# Patient Record
Sex: FEMALE | Race: WHITE | NOT HISPANIC OR LATINO | Employment: PART TIME
[De-identification: names, ages, dates, MRNs, and addresses within clinical notes are randomized per-mention and may not be internally consistent; named-entity substitution may affect disease eponyms.]

---

## 2017-09-10 ENCOUNTER — HEALTH MAINTENANCE LETTER (OUTPATIENT)
Age: 42
End: 2017-09-10

## 2018-01-18 ENCOUNTER — OFFICE VISIT (OUTPATIENT)
Dept: FAMILY MEDICINE | Facility: CLINIC | Age: 43
End: 2018-01-18
Payer: COMMERCIAL

## 2018-01-18 VITALS
SYSTOLIC BLOOD PRESSURE: 84 MMHG | DIASTOLIC BLOOD PRESSURE: 53 MMHG | OXYGEN SATURATION: 98 % | HEART RATE: 94 BPM | TEMPERATURE: 98.4 F

## 2018-01-18 DIAGNOSIS — R11.2 INTRACTABLE VOMITING WITH NAUSEA, UNSPECIFIED VOMITING TYPE: ICD-10-CM

## 2018-01-18 DIAGNOSIS — N10 ACUTE PYELONEPHRITIS: Primary | ICD-10-CM

## 2018-01-18 LAB
ALBUMIN UR-MCNC: 100 MG/DL
APPEARANCE UR: CLEAR
BACTERIA #/AREA URNS HPF: ABNORMAL /HPF
BILIRUB UR QL STRIP: ABNORMAL
COLOR UR AUTO: YELLOW
GLUCOSE UR STRIP-MCNC: NEGATIVE MG/DL
HGB UR QL STRIP: ABNORMAL
KETONES UR STRIP-MCNC: >=80 MG/DL
LEUKOCYTE ESTERASE UR QL STRIP: ABNORMAL
MUCOUS THREADS #/AREA URNS LPF: PRESENT /LPF
NITRATE UR QL: NEGATIVE
NON-SQ EPI CELLS #/AREA URNS LPF: ABNORMAL /LPF
PH UR STRIP: 5 PH (ref 5–7)
RBC #/AREA URNS AUTO: ABNORMAL /HPF
RENAL EPI CELLS #/AREA URNS HPF: ABNORMAL /HPF
SOURCE: ABNORMAL
SP GR UR STRIP: 1.02 (ref 1–1.03)
UROBILINOGEN UR STRIP-ACNC: 0.2 EU/DL (ref 0.2–1)
WBC #/AREA URNS AUTO: ABNORMAL /HPF

## 2018-01-18 PROCEDURE — 87086 URINE CULTURE/COLONY COUNT: CPT | Performed by: PHYSICIAN ASSISTANT

## 2018-01-18 PROCEDURE — 96372 THER/PROPH/DIAG INJ SC/IM: CPT | Performed by: PHYSICIAN ASSISTANT

## 2018-01-18 PROCEDURE — 99214 OFFICE O/P EST MOD 30 MIN: CPT | Mod: 25 | Performed by: PHYSICIAN ASSISTANT

## 2018-01-18 PROCEDURE — 81001 URINALYSIS AUTO W/SCOPE: CPT | Performed by: PHYSICIAN ASSISTANT

## 2018-01-18 RX ORDER — ONDANSETRON 4 MG/1
4 TABLET, ORALLY DISINTEGRATING ORAL ONCE
Qty: 1 TABLET | Refills: 1
Start: 2018-01-18 | End: 2018-01-18

## 2018-01-18 RX ORDER — CIPROFLOXACIN 500 MG/1
500 TABLET, FILM COATED ORAL 2 TIMES DAILY
Qty: 20 TABLET | Refills: 0 | Status: SHIPPED | OUTPATIENT
Start: 2018-01-18 | End: 2018-05-02

## 2018-01-18 RX ORDER — ONDANSETRON 4 MG/1
4-8 TABLET, ORALLY DISINTEGRATING ORAL EVERY 8 HOURS PRN
Qty: 20 TABLET | Refills: 1 | Status: SHIPPED | OUTPATIENT
Start: 2018-01-18 | End: 2018-05-02

## 2018-01-18 RX ORDER — CEFTRIAXONE 1 G/1
1000 INJECTION, POWDER, FOR SOLUTION INTRAMUSCULAR; INTRAVENOUS ONCE
Qty: 10 ML | Refills: 0 | OUTPATIENT
Start: 2018-01-18 | End: 2018-01-18

## 2018-01-18 ASSESSMENT — PAIN SCALES - GENERAL: PAINLEVEL: NO PAIN (0)

## 2018-01-18 NOTE — NURSING NOTE
The following medication was given at 4:00 pm:     MEDICATION: Ondansetron Orally Disintegrating Tablets 4mg  SITE: orally  LOT #: [id number]  :  Vianca  EXPIRATION DATE:  05/2020  NDC 65682-390-10    Due to injection administration, patient instructed to remain in clinic for 15 minutes  afterwards, and to report any adverse reaction to me immediately.

## 2018-01-18 NOTE — PATIENT INSTRUCTIONS
"Cipro 500 mg twice a day for 10 days  Zofran 1-2 tablets  Every 6-8 hours as needed for nausea  Tylenol or Ibuprofen for pain or fever  Increase fluids  Follow up in 3 days if not improves  Kidney Infection (Adult, Female)    An infection in one or both kidneys is called \"pyelonephritis.\" It usually happens when bacteria (or rarely, viruses, fungi, or other disease-causing organisms) get into the kidney. The bacteria (or other disease-causing organisms) can enter the kidneys from the bladder or blood traveling from other parts of the body. A kidney infection can become serious. It can cause severe illness, scarring of the kidneys, or kidney failure if not treated properly.  Common causes for this problem include:    Not keeping the genital area clean and dry, which promotes the growth of bacteria    Wiping back to front which drags bacteria from the rectum toward the urinary opening (urethra)    Wearing tight pants or underwear (this lets moisture build up in the genital area, which helps bacteria grow)    Holding urine in for long periods of time    Dehydration  Kidney infections can cause symptoms similar to a bladder infection. Symptoms include:    Pain (or burning) when urinating    Having to urinate more often than usual    Blood in the urine (pink or red)    Abdominal pain or discomfort, usually in the lower abdomen    Pain in the side or back    Pain above the pubic bone    Fever or chills    Vomiting    Loss of appetite  Treatment is oral antibiotics, or in more severe cases, intramuscular or IV antibiotics. These are started right away and may be changed once urine culture results determine the infecting organisms. Treatment helps prevent a more serious kidney infection.  Medicines  Medicines can help in the treatment of a bladder infection:    Take antibiotics until they are used up, even if you feel better. It is important to finish them to make sure the infection is gone.    Unless another medicine was " prescribed, you can use over-the-counter medicines for pain, fever, or discomfort. If you have chronic liver of kidney disease, talk with your healthcare provider before using these medicines. Also talk with your provider if you've ever had a stomach ulcer or gastrointestinal (GI) bleeding, or are taking blood thinners.  Home care  The following are general care guidelines:    Stay home from work or school. Rest in bed until your fever breaks and you are feeling better, or as advised by your healthcare provider.    Drink lots of fluid unless you must restrict fluids for other medical reasons. This will force the medicine into your urinary system and flush the bacteria out of your body. Ask your healthcare provider how much you should drink.    Avoid sex until you have finished all of your medicine and your symptoms are gone.    Avoid caffeine, alcohol, and spicy foods. These foods may irritate the kidneys and bladder.    Avoid taking bubble baths. Sensitivity to the chemicals in bubble baths can irritate the urethra.    Make sure you wipe from front to back after using the toilet.    Wear loose cloths and cotton underwear.  Prevention  These self-care steps can help prevent future infections:    Drink plenty of fluids to prevent dehydration and flush out the bladder. Do this unless you must restrict fluids for other health reasons, or your healthcare provider told you not to.    Proper cleaning after going to the bathroom in important. Make sure you wipe from front to back after using the toilet.    Urinate more often. Don't try to hold urine in for a long time.    Avoid tight-fitting pants and underwear.    Improve your diet to prevent constipation. Eat more fruits, vegetables, and fiber. Eat less junk and fatty foods. Constipation can make a urinary tract infection more likely. Talk with your healthcare provider if you have trouble with bowel movements.    Urinate right after intercourse to flush out the  bladder.  Follow-up care  Follow up with your healthcare provider, or as advised. Additional testing may be needed to make sure the infection has cleared. Close follow-up and further testing is very important to find the cause and to prevent future infections.  If a urine culture was done, you will be contacted if your treatment needs to be changed. If directed, you may call to find out the results.  If you had an X-ray, CT scan, or other diagnostic test, you will be notified of any new findings that may affect your care.  Call 911  Call 911 if any of the following occur:    Trouble breathing    Fainting or loss of consciousness    Rapid or very slow heart rate    Weakness, dizziness, or fainting    Difficulty arousing or confusion  When to seek medical advice  Call your healthcare provider right away if any of these occur:    Fever 100.4 F (38 C) or higher after 48 hours of treatment, or as advised by your healthcare provider    Not feeling better within 1 to 2 days after starting antibiotics    Any symptom that continues after 3 days of treatment    Increasing pain in the stomach, back, side, or groin area    Repeated vomiting    Not able to take prescribed medicine due to nausea or another reason    Bloody, dark-colored, or foul smelling urine    Trouble urinating or decreased urine output    No urine for 8 hours, no tears when crying, sunken eyes, or dry mouth  Date Last Reviewed: 10/1/2016    [id number] [employer]. [address] [place] [address], [place] [address]. All rights reserved. This information is not intended as a substitute for professional medical care. Always follow your healthcare professional's instructions.

## 2018-01-18 NOTE — PROGRESS NOTES
SUBJECTIVE:   Cherrie Austin is a 42 year old female who presents to clinic today for the following health issues:      URINARY TRACT SYMPTOMS      Duration: x 2 days    Description  dysuria and urgency    Intensity:  severe    Accompanying signs and symptoms:  Fever/chills: YES  Flank pain no   Nausea and vomiting: YES  Vaginal symptoms: none  Abdominal/Pelvic Pain: YES    History  History of frequent UTI's: no   History of kidney stones: no   Sexually Active: YES  Possibility of pregnancy: No    Precipitating or alleviating factors: None    Therapies tried and outcome: course of antibiotics - Macrobid (received through ([email or web address]) and cranberry juice  Tylenol, IBUnot better   Outcome: getting worse        Problem list and histories reviewed & adjusted, as indicated.  Additional history: as documented    Patient Active Problem List   Diagnosis     Other kyphoscoliosis and scoliosis     Family history of breast cancer in first degree relative     CARDIOVASCULAR SCREENING; LDL GOAL LESS THAN 160     Right hip pain     Cystic breast     Past Surgical History:   Procedure Laterality Date     C SCOLIOSIS SCREENING      surgery age 15       Social History   Substance Use Topics     Smoking status: Never Smoker     Smokeless tobacco: Never Used     Alcohol use 0.5 oz/week      Comment: 1-2 drinks a month     Family History   Problem Relation Age of Onset     Asthma Mother      Breast Cancer Mother 48     CEREBROVASCULAR DISEASE Maternal Grandmother      CANCER Maternal Grandmother      Colon ca     CANCER Maternal Grandfather      Lung ca     Genitourinary Problems Paternal Grandmother      Psychotic Disorder Paternal Grandfather      Psychotic Disorder Brother      CANCER Paternal Grandmother      2 Great Aunts with breast cancer 60     CANCER       maternal cousin     Alzheimer Disease Paternal Grandfather      Alzheimer Disease Other      paternal cousins         Current Outpatient Prescriptions   Medication Sig  Dispense Refill     Nitrofurantoin Monohyd Macro (MACROBID PO) Take 100 mg by mouth 2 times daily (with meals)       ondansetron (ZOFRAN ODT) 4 MG ODT tab Take 1-2 tablets (4-8 mg) by mouth every 8 hours as needed for nausea 20 tablet 1     cefTRIAXone (ROCEPHIN) 1 GM vial Inject 1 g (1,000 mg) into the muscle once for 1 dose 10 mL 0     ondansetron (ZOFRAN ODT) 4 MG ODT tab Take 1 tablet (4 mg) by mouth once for 1 dose 1 tablet 1     ciprofloxacin (CIPRO) 500 MG tablet Take 1 tablet (500 mg) by mouth 2 times daily 20 tablet 0     Allergies   Allergen Reactions     Penicillins      rash       ROS:  Constitutional, HEENT, cardiovascular, pulmonary, GI, , musculoskeletal, neuro, skin, endocrine and psych systems are negative, except as otherwise noted.      OBJECTIVE:   BP (!) 84/53  Pulse 94  Temp 98.4  F (36.9  C) (Oral)  SpO2 98%  Breastfeeding? No  There is no height or weight on file to calculate BMI.  GENERAL: healthy, alert and no distress  NECK: no adenopathy, no asymmetry, masses, or scars and thyroid normal to palpation  RESP: lungs clear to auscultation - no rales, rhonchi or wheezes  CV: regular rate and rhythm, normal S1 S2, no S3 or S4, no murmur, click or rub, no peripheral edema and peripheral pulses strong  ABDOMEN: soft, nontender, no hepatosplenomegaly, no masses and bowel sounds normal  MS: no gross musculoskeletal defects noted, no edema  BACK:  CVA tenderness on the left     Diagnostic Test Results:  Results for orders placed or performed in visit on 01/18/18 (from the past 24 hour(s))   *UA reflex to Microscopic and Culture ([place] and [facility] (except [place] and [place])   Result Value Ref Range    Color Urine Yellow     Appearance Urine Clear     Glucose Urine Negative NEG^Negative mg/dL    Bilirubin Urine Small (A) NEG^Negative    Ketones Urine >=80 (A) NEG^Negative mg/dL    Specific Gravity Urine 1.020 1.003 - 1.035    Blood Urine Large (A) NEG^Negative    pH Urine 5.0 5.0  - 7.0 pH    Protein Albumin Urine 100 (A) NEG^Negative mg/dL    Urobilinogen Urine 0.2 0.2 - 1.0 EU/dL    Nitrite Urine Negative NEG^Negative    Leukocyte Esterase Urine Moderate (A) NEG^Negative    Source Midstream Urine    Urine Microscopic   Result Value Ref Range    WBC Urine 10-25 (A) OTO2^O - 2 /HPF    RBC Urine 10-25 (A) OTO2^O - 2 /HPF    Squamous Epithelial /LPF Urine Moderate (A) FEW^Few /LPF    Renal Tub Epi Few (A) NEG^Negative /HPF    Bacteria Urine Moderate (A) NEG^Negative /HPF    Mucous Urine Present (A) NEG^Negative /LPF       ASSESSMENT/PLAN:       ICD-10-CM    1. Acute pyelonephritis N10 Urine Culture Aerobic Bacterial     ondansetron (ZOFRAN ODT) 4 MG ODT tab     cefTRIAXone (ROCEPHIN) 1 GM vial     ondansetron (ZOFRAN ODT) 4 MG ODT tab     ciprofloxacin (CIPRO) 500 MG tablet   2. Intractable vomiting with nausea, unspecified vomiting type R11.2 ondansetron (ZOFRAN ODT) 4 MG ODT tab     ondansetron (ZOFRAN ODT) 4 MG ODT tab   patient received Rocephin 1 gram IM and tolerated well in clinic.   Zofran 4 mg ODT in clinic   Cipro 500 mg twice a day for 10 days  Zofran 1-2 tablets  Every 6-8 hours as needed for nausea  Tylenol or Ibuprofen for pain or fever  Increase fluids  Follow up in 3 days if not improves      Ольга Bond PA-C  [facility]

## 2018-01-18 NOTE — MR AVS SNAPSHOT
"              After Visit Summary   1/18/2018    Cherrie Austin    MRN: [id number]           Patient Information     Date Of Birth          1975        Visit Information        Provider Department      1/18/2018 3:20 PM Ольга Bond PA-C [facility]        Today's Diagnoses     Acute pyelonephritis    -  1    Intractable vomiting with nausea, unspecified vomiting type          Care Instructions    Cipro 500 mg twice a day for 10 days  Zofran 1-2 tablets  Every 6-8 hours as needed for nausea  Tylenol or Ibuprofen for pain or fever  Increase fluids  Follow up in 3 days if not improves  Kidney Infection (Adult, Female)    An infection in one or both kidneys is called \"pyelonephritis.\" It usually happens when bacteria (or rarely, viruses, fungi, or other disease-causing organisms) get into the kidney. The bacteria (or other disease-causing organisms) can enter the kidneys from the bladder or blood traveling from other parts of the body. A kidney infection can become serious. It can cause severe illness, scarring of the kidneys, or kidney failure if not treated properly.  Common causes for this problem include:    Not keeping the genital area clean and dry, which promotes the growth of bacteria    Wiping back to front which drags bacteria from the rectum toward the urinary opening (urethra)    Wearing tight pants or underwear (this lets moisture build up in the genital area, which helps bacteria grow)    Holding urine in for long periods of time    Dehydration  Kidney infections can cause symptoms similar to a bladder infection. Symptoms include:    Pain (or burning) when urinating    Having to urinate more often than usual    Blood in the urine (pink or red)    Abdominal pain or discomfort, usually in the lower abdomen    Pain in the side or back    Pain above the pubic bone    Fever or chills    Vomiting    Loss of appetite  Treatment is oral antibiotics, or in more severe " cases, intramuscular or IV antibiotics. These are started right away and may be changed once urine culture results determine the infecting organisms. Treatment helps prevent a more serious kidney infection.  Medicines  Medicines can help in the treatment of a bladder infection:    Take antibiotics until they are used up, even if you feel better. It is important to finish them to make sure the infection is gone.    Unless another medicine was prescribed, you can use over-the-counter medicines for pain, fever, or discomfort. If you have chronic liver of kidney disease, talk with your healthcare provider before using these medicines. Also talk with your provider if you've ever had a stomach ulcer or gastrointestinal (GI) bleeding, or are taking blood thinners.  Home care  The following are general care guidelines:    Stay home from work or school. Rest in bed until your fever breaks and you are feeling better, or as advised by your healthcare provider.    Drink lots of fluid unless you must restrict fluids for other medical reasons. This will force the medicine into your urinary system and flush the bacteria out of your body. Ask your healthcare provider how much you should drink.    Avoid sex until you have finished all of your medicine and your symptoms are gone.    Avoid caffeine, alcohol, and spicy foods. These foods may irritate the kidneys and bladder.    Avoid taking bubble baths. Sensitivity to the chemicals in bubble baths can irritate the urethra.    Make sure you wipe from front to back after using the toilet.    Wear loose cloths and cotton underwear.  Prevention  These self-care steps can help prevent future infections:    Drink plenty of fluids to prevent dehydration and flush out the bladder. Do this unless you must restrict fluids for other health reasons, or your healthcare provider told you not to.    Proper cleaning after going to the bathroom in important. Make sure you wipe from front to back after  using the toilet.    Urinate more often. Don't try to hold urine in for a long time.    Avoid tight-fitting pants and underwear.    Improve your diet to prevent constipation. Eat more fruits, vegetables, and fiber. Eat less junk and fatty foods. Constipation can make a urinary tract infection more likely. Talk with your healthcare provider if you have trouble with bowel movements.    Urinate right after intercourse to flush out the bladder.  Follow-up care  Follow up with your healthcare provider, or as advised. Additional testing may be needed to make sure the infection has cleared. Close follow-up and further testing is very important to find the cause and to prevent future infections.  If a urine culture was done, you will be contacted if your treatment needs to be changed. If directed, you may call to find out the results.  If you had an X-ray, CT scan, or other diagnostic test, you will be notified of any new findings that may affect your care.  Call 911  Call 911 if any of the following occur:    Trouble breathing    Fainting or loss of consciousness    Rapid or very slow heart rate    Weakness, dizziness, or fainting    Difficulty arousing or confusion  When to seek medical advice  Call your healthcare provider right away if any of these occur:    Fever 100.4 F (38 C) or higher after 48 hours of treatment, or as advised by your healthcare provider    Not feeling better within 1 to 2 days after starting antibiotics    Any symptom that continues after 3 days of treatment    Increasing pain in the stomach, back, side, or groin area    Repeated vomiting    Not able to take prescribed medicine due to nausea or another reason    Bloody, dark-colored, or foul smelling urine    Trouble urinating or decreased urine output    No urine for 8 hours, no tears when crying, sunken eyes, or dry mouth  Date Last Reviewed: 10/1/2016    0219-1321 [employer]. [address] [place] [address], [place] [address]. All rights  reserved. This information is not intended as a substitute for professional medical care. Always follow your healthcare professional's instructions.                Follow-ups after your visit        Who to contact     If you have questions or need follow up information about today's clinic visit or your schedule please contact [facility] directly at [phone].  Normal or non-critical lab and imaging results will be communicated to you by MyChart, letter or phone within 4 business days after the clinic has received the results. If you do not hear from us within 7 days, please contact the clinic through [employer] or phone. If you have a critical or abnormal lab result, we will notify you by phone as soon as possible.  Submit refill requests through [employer] or call your pharmacy and they will forward the refill request to us. Please allow 3 business days for your refill to be completed.          Additional Information About Your Visit        MyChart Information     [employer] gives you secure access to your electronic health record. If you see a primary care provider, you can also send messages to your care team and make appointments. If you have questions, please call your primary care clinic.  If you do not have a primary care provider, please call [phone] and they will assist you.        Care EveryWhere ID     This is your Care EveryWhere ID. This could be used by other organizations to access your [facility] medical records  [id number]        Your Vitals Were     Pulse Temperature Pulse Oximetry Breastfeeding?          94 98.4  F (36.9  C) (Oral) 98% No         Blood Pressure from Last 3 Encounters:   01/18/18 (!) 84/53   05/05/15 98/58   07/15/14 102/60    Weight from Last 3 Encounters:   05/05/15 114 lb 11.2 oz (52 kg)   07/15/14 116 lb (52.6 kg)   03/25/14 120 lb 4.8 oz (54.6 kg)              We Performed the Following     *UA reflex to Microscopic and Culture ([place] and [facility]  (except [place] and [place])     INJECTION INTRAMUSCULAR OR SUB-Q     Urine Culture Aerobic Bacterial     Urine Microscopic          Today's Medication Changes          These changes are accurate as of: 1/18/18  4:28 PM.  If you have any questions, ask your nurse or doctor.               Start taking these medicines.        Dose/Directions    cefTRIAXone 1 GM vial   Commonly known as:  ROCEPHIN   Used for:  Acute pyelonephritis   Started by:  Ольга Bond PA-C        Dose:  1000 mg   Inject 1 g (1,000 mg) into the muscle once for 1 dose   Quantity:  10 mL   Refills:  0       ciprofloxacin 500 MG tablet   Commonly known as:  CIPRO   Used for:  Acute pyelonephritis   Started by:  Ольга Bond PA-C        Dose:  500 mg   Take 1 tablet (500 mg) by mouth 2 times daily   Quantity:  20 tablet   Refills:  0       * ondansetron 4 MG ODT tab   Commonly known as:  ZOFRAN ODT   Used for:  Acute pyelonephritis, Intractable vomiting with nausea, unspecified vomiting type   Started by:  Ольга Bond PA-C        Dose:  4-8 mg   Take 1-2 tablets (4-8 mg) by mouth every 8 hours as needed for nausea   Quantity:  20 tablet   Refills:  1       * ondansetron 4 MG ODT tab   Commonly known as:  ZOFRAN ODT   Used for:  Acute pyelonephritis, Intractable vomiting with nausea, unspecified vomiting type   Started by:  Ольга Bond PA-C        Dose:  4 mg   Take 1 tablet (4 mg) by mouth once for 1 dose   Quantity:  1 tablet   Refills:  1       * Notice:  This list has 2 medication(s) that are the same as other medications prescribed for you. Read the directions carefully, and ask your doctor or other care provider to review them with you.      Stop taking these medicines if you haven't already. Please contact your care team if you have questions.     CALCIUM + D PO   Stopped by:  Ольга Bond PA-C           fish oil-omega-3 fatty acids 1000 MG capsule    Stopped by:  Ольга Bond PA-C           VITAMIN C PO   Stopped by:  Ольга Bond PA-C                Where to get your medicines      These medications were sent to [facility] - [place], [place] - [address]  [address], [place] [address]     Phone:  [phone]     ciprofloxacin 500 MG tablet    ondansetron 4 MG ODT tab         Some of these will need a paper prescription and others can be bought over the counter.  Ask your nurse if you have questions.     You don't need a prescription for these medications     cefTRIAXone 1 GM vial    ondansetron 4 MG ODT tab                Primary Care Provider Office Phone # Fax #    Maia Watson [phone] [address] [address]        Equal Access to Services     [facility] : Hadii aad ku hadasho Soomaali, waaxda luqadaha, qaybta kaalmada adeegyada, waxay idiin hayaan adi mc . So [facility] [phone].    ATENCIÓN: Si habla español, tiene a toro disposición servicios gratuitos de asistencia lingüística. [facility] [phone].    We comply with applicable federal civil rights laws and [place] laws. We do not discriminate on the basis of race, color, national origin, age, disability, sex, sexual orientation, or gender identity.            Thank you!     Thank you for choosing [facility]  for your care. Our goal is always to provide you with excellent care. Hearing back from our patients is one way we can continue to improve our services. Please take a few minutes to complete the written survey that you may receive in the mail after your visit with us. Thank you!             Your Updated Medication List - Protect others around you: Learn how to safely use, store and throw away your medicines at www.disposemymeds.org.          This list is accurate as of: 1/18/18  4:28 PM.  Always use your most recent med list.                    Brand Name Dispense Instructions for use Diagnosis    cefTRIAXone 1 GM vial    ROCEPHIN    10 mL    Inject 1 g (1,000 mg) into the muscle once for 1 dose    Acute pyelonephritis       ciprofloxacin 500 MG tablet    CIPRO    20 tablet    Take 1 tablet (500 mg) by mouth 2 times daily    Acute pyelonephritis       MACROBID PO      Take 100 mg by mouth 2 times daily (with meals)        * ondansetron 4 MG ODT tab    ZOFRAN ODT    20 tablet    Take 1-2 tablets (4-8 mg) by mouth every 8 hours as needed for nausea    Acute pyelonephritis, Intractable vomiting with nausea, unspecified vomiting type       * ondansetron 4 MG ODT tab    ZOFRAN ODT    1 tablet    Take 1 tablet (4 mg) by mouth once for 1 dose    Acute pyelonephritis, Intractable vomiting with nausea, unspecified vomiting type       * Notice:  This list has 2 medication(s) that are the same as other medications prescribed for you. Read the directions carefully, and ask your doctor or other care provider to review them with you.

## 2018-01-18 NOTE — NURSING NOTE
The following medication was given at 4:08 pm :     MEDICATION: Rocephin 1000mg and Lidocaine 2.1cc  ROUTE: IM  SITE: Ventrogluteal - Right  DOSE: 2.1 ML  LOT #: [id number]  :  Fanminderira Premlennox  EXPIRATION DATE:  5/1/2020 09/21  NDC 7777-5238-49 [id number]    Due to injection administration, patient instructed to remain in clinic for 15 minutes  afterwards, and to report any adverse reaction to me immediately.    Mark TORRES

## 2018-01-19 LAB
BACTERIA SPEC CULT: NORMAL
SPECIMEN SOURCE: NORMAL

## 2018-05-02 ENCOUNTER — OFFICE VISIT (OUTPATIENT)
Dept: FAMILY MEDICINE | Facility: CLINIC | Age: 43
End: 2018-05-02
Payer: COMMERCIAL

## 2018-05-02 VITALS
HEIGHT: 64 IN | DIASTOLIC BLOOD PRESSURE: 62 MMHG | WEIGHT: 118.2 LBS | RESPIRATION RATE: 15 BRPM | BODY MASS INDEX: 20.18 KG/M2 | SYSTOLIC BLOOD PRESSURE: 102 MMHG | HEART RATE: 83 BPM | TEMPERATURE: 98.3 F

## 2018-05-02 DIAGNOSIS — Z80.3 FAMILY HISTORY OF BREAST CANCER IN MOTHER: ICD-10-CM

## 2018-05-02 DIAGNOSIS — Z97.5 IUD (INTRAUTERINE DEVICE) IN PLACE: ICD-10-CM

## 2018-05-02 DIAGNOSIS — Z00.00 ROUTINE GENERAL MEDICAL EXAMINATION AT A HEALTH CARE FACILITY: Primary | ICD-10-CM

## 2018-05-02 DIAGNOSIS — L81.2 FRECKLES: ICD-10-CM

## 2018-05-02 DIAGNOSIS — H91.93 DECREASED HEARING OF BOTH EARS: ICD-10-CM

## 2018-05-02 PROCEDURE — G0145 SCR C/V CYTO,THINLAYER,RESCR: HCPCS | Performed by: FAMILY MEDICINE

## 2018-05-02 PROCEDURE — 99396 PREV VISIT EST AGE 40-64: CPT | Performed by: FAMILY MEDICINE

## 2018-05-02 PROCEDURE — 87624 HPV HI-RISK TYP POOLED RSLT: CPT | Performed by: FAMILY MEDICINE

## 2018-05-02 NOTE — PROGRESS NOTES
SUBJECTIVE:   CC: Cherrie Austin is an 42 year old woman who presents for preventive health visit.     Physical   Annual:     Getting at least 3 servings of Calcium per day::  Yes    Bi-annual eye exam::  Yes    Dental care twice a year::  Yes    Sleep apnea or symptoms of sleep apnea::  None    Diet::  Gluten-free/reduced    Frequency of exercise::  6-7 days/week    Duration of exercise::  Greater than 60 minutes    Taking medications regularly::  Yes    Medication side effects::  Not applicable    Additional concerns today::  No            (Z00.00) Routine general medical examination at a health care facility  (primary encounter diagnosis)  Comment:   Plan: Pap imaged thin layer screen with HPV -         recommended age 30 - 65 years (select HPV order        below), HPV High Risk Types DNA Cervical            (H91.93) Decreased hearing of both ears  Comment: Difficulty with hearing  Worse in loud situations  No ringing  Affecting both sides evenly  Plan: AUDIOLOGY ADULT REFERRAL            (Z97.5) IUD (intrauterine device) in place  Comment: update her problem list  Plan:     (L81.2) Yves  Comment: would like to see derm  Plan: DERMATOLOGY REFERRAL            (Z80.3) Family history of breast cancer in mother  Comment: reviewed routine mammograms starting now  Had had imaging before  Plan: *MA Screening Digital Bilateral                     Today's PHQ-2 Score:   PHQ-2 ( 1999 [employer]) 5/2/2018   Q1: Little interest or pleasure in doing things 0   Q2: Feeling down, depressed or hopeless 0   PHQ-2 Score 0   Q1: Little interest or pleasure in doing things Not at all   Q2: Feeling down, depressed or hopeless Not at all   PHQ-2 Score 0       Abuse: Current or Past(Physical, Sexual or Emotional)- No  Do you feel safe in your environment - Yes    Social History   Substance Use Topics     Smoking status: Never Smoker     Smokeless tobacco: Never Used     Alcohol use 0.5 oz/week      Comment: 1-2 drinks a month      Alcohol Use 5/2/2018   If you drink alcohol do you typically have greater than 3 drinks per day OR greater than 7 drinks per week? No   No flowsheet data found.    Reviewed orders with patient.  Reviewed health maintenance and updated orders accordingly - Yes  Labs reviewed in EPIC  BP Readings from Last 3 Encounters:   05/02/18 102/62   01/18/18 (!) 84/53   05/05/15 98/58    Wt Readings from Last 3 Encounters:   05/02/18 118 lb 3.2 oz (53.6 kg)   05/05/15 114 lb 11.2 oz (52 kg)   07/15/14 116 lb (52.6 kg)                  Patient Active Problem List   Diagnosis     Other kyphoscoliosis and scoliosis     Family history of breast cancer in first degree relative     CARDIOVASCULAR SCREENING; LDL GOAL LESS THAN 160     Right hip pain     Cystic breast     IUD (intrauterine device) in place     Past Surgical History:   Procedure Laterality Date     C SCOLIOSIS SCREENING      surgery age 15       Social History   Substance Use Topics     Smoking status: Never Smoker     Smokeless tobacco: Never Used     Alcohol use 0.5 oz/week      Comment: 1-2 drinks a month     Family History   Problem Relation Age of Onset     Asthma Mother      Breast Cancer Mother 48     CEREBROVASCULAR DISEASE Maternal Grandmother      CANCER Maternal Grandmother      Colon ca     CANCER Maternal Grandfather      Lung ca     Genitourinary Problems Paternal Grandmother      CANCER Paternal Grandmother      2 Great Aunts with breast cancer 60     Psychotic Disorder Paternal Grandfather      Alzheimer Disease Paternal Grandfather      Psychotic Disorder Brother      CANCER Other      maternal cousin     Alzheimer Disease Other      paternal cousins           Patient under age 50, mutual decision reflected in health maintenance.      Pertinent mammograms are reviewed under the imaging tab.  History of abnormal Pap smear: NO - age 30-65 PAP every 5 years with negative HPV co-testing recommended    Reviewed and updated as needed this visit by  "clinical staff         Reviewed and updated as needed this visit by Provider        Past Medical History:   Diagnosis Date     NO ACTIVE PROBLEMS       Past Surgical History:   Procedure Laterality Date     C SCOLIOSIS SCREENING      surgery age 15       Review of Systems  CONSTITUTIONAL: NEGATIVE for fever, chills, change in weight  INTEGUMENTARU/SKIN: NEGATIVE for worrisome rashes, moles or lesions  EYES: NEGATIVE for vision changes or irritation  ENT: NEGATIVE for ear, mouth and throat problems  RESP: NEGATIVE for significant cough or SOB  BREAST: NEGATIVE for masses, tenderness or discharge  CV: NEGATIVE for chest pain, palpitations or peripheral edema  GI: NEGATIVE for nausea, abdominal pain, heartburn, or change in bowel habits  : NEGATIVE for unusual urinary or vaginal symptoms. Periods are regular.  MUSCULOSKELETAL: NEGATIVE for significant arthralgias or myalgia  NEURO: NEGATIVE for weakness, dizziness or paresthesias  PSYCHIATRIC: NEGATIVE for changes in mood or affect     OBJECTIVE:   /62  Pulse 83  Temp 98.3  F (36.8  C) (Oral)  Resp 15  Ht 5' 3.5\" (1.613 m)  Wt 118 lb 3.2 oz (53.6 kg)  Breastfeeding? No  BMI 20.61 kg/m2  Physical Exam  GENERAL: healthy, alert and no distress  EYES: Eyes grossly normal to inspection, PERRL and conjunctivae and sclerae normal  HENT: ear canals and TM's normal, nose and mouth without ulcers or lesions  NECK: no adenopathy, no asymmetry, masses, or scars and thyroid normal to palpation  RESP: lungs clear to auscultation - no rales, rhonchi or wheezes  BREAST: normal without masses, tenderness or nipple discharge and no palpable axillary masses or adenopathy  CV: regular rate and rhythm, normal S1 S2, no S3 or S4, no murmur, click or rub, no peripheral edema and peripheral pulses strong  ABDOMEN: soft, nontender, no hepatosplenomegaly, no masses and bowel sounds normal   (female): normal female external genitalia, normal urethral meatus, vaginal mucosa pink, " "moist, well rugated, and normal cervix/adnexa/uterus without masses or discharge  MS: no gross musculoskeletal defects noted, no edema  SKIN: no suspicious lesions or rashes numerous freckles in al sun exposed skin  NEURO: Normal strength and tone, mentation intact and speech normal  PSYCH: mentation appears normal, affect normal/bright    ASSESSMENT/PLAN:   1. Routine general medical examination at a health care facility  See AVS  - Pap imaged thin layer screen with HPV - recommended age 30 - 65 years (select HPV order below)  - HPV High Risk Types DNA Cervical    2. Decreased hearing of both ears  Referred   - AUDIOLOGY ADULT REFERRAL    3. IUD (intrauterine device) in place  noted    4. Freckles  referred  - DERMATOLOGY REFERRAL    5. Family history of breast cancer in mother  Discussed regular mammograms starting now  - *MA Screening Digital Bilateral; Future    COUNSELING:  Reviewed preventive health counseling, as reflected in patient instructions       Regular exercise       Healthy diet/nutrition         reports that she has never smoked. She has never used smokeless tobacco.    Estimated body mass index is 20 kg/(m^2) as calculated from the following:    Height as of 5/5/15: 5' 3.5\" (1.613 m).    Weight as of 5/5/15: 114 lb 11.2 oz (52 kg).       Counseling Resources:  ATP IV Guidelines  Pooled Cohorts Equation Calculator  Breast Cancer Risk Calculator  FRAX Risk Assessment  ICSI Preventive Guidelines  Dietary Guidelines for Americans, 2010  USDA's MyPlate  ASA Prophylaxis  Lung CA Screening    Maia Watson MD  [facility] CLINICAnswers for HPI/ROS submitted by the patient on 5/2/2018   PHQ-2 Score: 0    "

## 2018-05-02 NOTE — MR AVS SNAPSHOT
After Visit Summary   5/2/2018    Cherrie Austin    MRN: [id number]           Patient Information     Date Of Birth          1975        Visit Information        Provider Department      5/2/2018 2:30 PM Maia Watson MD [facility]        Today's Diagnoses     Routine general medical examination at a health care facility    -  1    Decreased hearing of both ears        IUD (intrauterine device) in place        Freckles          Care Instructions      Preventive Health Recommendations  Female Ages 40 to 49    Yearly exam:     See your health care provider every year in order to  1. Review health changes.   2. Discuss preventive care.    3. Review your medicines if your doctor prescribed any.      Get a Pap test every three years (unless you have an abnormal result and your provider advises testing more often).      If you get Pap tests with HPV test, you only need to test every 5 years, unless you have an abnormal result. You do not need a Pap test if your uterus was removed (hysterectomy) and you have not had cancer.      You should be tested each year for STDs (sexually transmitted diseases), if you're at risk.       Ask your doctor if you should have a mammogram.      Have a colonoscopy (test for colon cancer) if someone in your family has had colon cancer or polyps before age 50.       Have a cholesterol test every 5 years.       Have a diabetes test (fasting glucose) after age 45. If you are at risk for diabetes, you should have this test every 3 years.    Shots: Get a flu shot each year. Get a tetanus shot every 10 years.     Nutrition:     Eat at least 5 servings of fruits and vegetables each day.    Eat whole-grain bread, whole-wheat pasta and brown rice instead of white grains and rice.    Talk to your provider about Calcium and Vitamin D.     Lifestyle    Exercise at least 150 minutes a week (an average of 30 minutes a day, 5 days a week). This will help you  control your weight and prevent disease.    Limit alcohol to one drink per day.    No smoking.     Wear sunscreen to prevent skin cancer.    See your dentist every six months for an exam and cleaning.          Follow-ups after your visit        Additional Services     AUDIOLOGY ADULT REFERRAL       Your provider has referred you to: [facility]: Audiology and Aural Rehab Services - [place] [phone]   [email or web address]    Specialty Testing:  Audiogram w/Tymps and Reflexes (Comprehensive Audiology Evaluation)            DERMATOLOGY REFERRAL       Your provider has referred you to: [facility]: Dermatology Clinic - [place] [phone]   [email or web address]  [facility]: [facility] [phone]   [email or web address]    Please be aware that coverage of these services is subject to the terms and limitations of your health insurance plan.  Call member services at your health plan with any benefit or coverage questions.      Please bring the following with you to your appointment:    (1) Any X-Rays, CTs or MRIs which have been performed.  Contact the facility where they were done to arrange for  prior to your scheduled appointment.    (2) List of current medications  (3) This referral request   (4) Any documents/labs given to you for this referral                  Who to contact     If you have questions or need follow up information about today's clinic visit or your schedule please contact [facility] directly at [phone].  Normal or non-critical lab and imaging results will be communicated to you by MyChart, letter or phone within 4 business days after the clinic has received the results. If you do not hear from us within 7 days, please contact the clinic through MyChart or phone. If you have a critical or abnormal lab  "result, we will notify you by phone as soon as possible.  Submit refill requests through MeetMe or call your pharmacy and they will forward the refill request to us. Please allow 3 business days for your refill to be completed.          Additional Information About Your Visit        [employer] Information     MeetMe gives you secure access to your electronic health record. If you see a primary care provider, you can also send messages to your care team and make appointments. If you have questions, please call your primary care clinic.  If you do not have a primary care provider, please call [phone] and they will assist you.        Care EveryWhere ID     This is your Care EveryWhere ID. This could be used by other organizations to access your [place] medical records  [id number]        Your Vitals Were     Pulse Temperature Respirations Height Breastfeeding? BMI (Body Mass Index)    83 98.3  F (36.8  C) (Oral) 15 5' 3.5\" (1.613 m) No 20.61 kg/m2       Blood Pressure from Last 3 Encounters:   05/02/18 102/62   01/18/18 (!) 84/53   05/05/15 98/58    Weight from Last 3 Encounters:   05/02/18 118 lb 3.2 oz (53.6 kg)   05/05/15 114 lb 11.2 oz (52 kg)   07/15/14 116 lb (52.6 kg)              We Performed the Following     AUDIOLOGY ADULT REFERRAL     DERMATOLOGY REFERRAL     HPV High Risk Types DNA Cervical     Pap imaged thin layer screen with HPV - recommended age 30 - 65 years (select HPV order below)        Primary Care Provider Office Phone # Fax #    LudellKatherine Watson [phone] [address] [address]        Equal Access to Services     [facility] : Hadbilly Whitman, heike whitmore, charles vargas. So [facility] [phone].    ATENCIÓN: Si habla español, tiene a toro disposición servicios gratuitos de asistencia lingüística. Llame al [phone].    We comply with applicable federal civil rights " laws and [place] laws. We do not discriminate on the basis of race, color, national origin, age, disability, sex, sexual orientation, or gender identity.            Thank you!     Thank you for choosing [facility]  for your care. Our goal is always to provide you with excellent care. Hearing back from our patients is one way we can continue to improve our services. Please take a few minutes to complete the written survey that you may receive in the mail after your visit with us. Thank you!             Your Updated Medication List - Protect others around you: Learn how to safely use, store and throw away your medicines at www.disposemymeds.org.      Notice  As of 5/2/2018  3:27 PM    You have not been prescribed any medications.

## 2018-05-02 NOTE — LETTER
May 10, 2018    Cherrieori Burrdolores  [address]  [place] [address]    Dear Cherrie,  We are happy to inform you that your PAP smear result from 05/02/18 is normal.  We are now able to do a follow up test on PAP smears. The DNA test is for HPV (Human Papilloma Virus). Cervical cancer is closely linked with certain types of HPV. Your results showed no evidence of high risk HPV.  Therefore we recommend you return in 5 years for your next pap smear and HPV test.  You will still need to return to the clinic every year for an annual exam and other preventive tests.  Please contact the clinic at [phone] with any questions.  Sincerely,    Maia Watson MD/[facility]

## 2018-05-07 LAB
COPATH REPORT: NORMAL
PAP: NORMAL

## 2018-05-09 LAB
FINAL DIAGNOSIS: NORMAL
HPV HR 12 DNA CVX QL NAA+PROBE: NEGATIVE
HPV16 DNA SPEC QL NAA+PROBE: NEGATIVE
HPV18 DNA SPEC QL NAA+PROBE: NEGATIVE
SPECIMEN DESCRIPTION: NORMAL
SPECIMEN SOURCE CVX/VAG CYTO: NORMAL

## 2018-09-27 ENCOUNTER — RADIANT APPOINTMENT (OUTPATIENT)
Dept: MAMMOGRAPHY | Facility: CLINIC | Age: 43
End: 2018-09-27
Attending: FAMILY MEDICINE
Payer: COMMERCIAL

## 2018-09-27 DIAGNOSIS — Z12.31 VISIT FOR SCREENING MAMMOGRAM: ICD-10-CM

## 2018-09-27 PROCEDURE — 77067 SCR MAMMO BI INCL CAD: CPT | Performed by: RADIOLOGY

## 2018-09-27 PROCEDURE — 77063 BREAST TOMOSYNTHESIS BI: CPT | Performed by: RADIOLOGY

## 2018-10-05 ENCOUNTER — TELEPHONE (OUTPATIENT)
Dept: GENERAL RADIOLOGY | Facility: CLINIC | Age: 43
End: 2018-10-05

## 2018-10-05 DIAGNOSIS — Z80.3 FAMILY HISTORY OF MALIGNANT NEOPLASM OF BREAST: Primary | ICD-10-CM

## 2018-10-05 NOTE — TELEPHONE ENCOUNTER
Received call from pt inquiring as to the mammogram result letter she received in the mail in regards to her increased lifetime risk of developing breast cancer. Discussed with pt the option to see a genetic counselor to discuss further testing and/or screening that could be done to screen for breast cancer. Pt is interested in pursuing genetic testing. Referral order placed for cosign and message sent to scheduling team to reach out to patient.

## 2018-10-24 ENCOUNTER — PRE VISIT (OUTPATIENT)
Dept: DERMATOLOGY | Facility: CLINIC | Age: 43
End: 2018-10-24

## 2018-10-24 NOTE — TELEPHONE ENCOUNTER
Left message for patient to call [phone] for previsit questions.    Amanda Bro CMA      **LOOK AT HEALTH MAINTENANCE**        Dermatology Pre-visit Call:    Reason for visit : Pt states that due to the fact that her skin freckles easy her PCP advised that she be seen for a full skin check.     Any personal history of skin cancers: No    Any family history of skin cancers: Yes Grandpa had skin cancer . Pt uncertain as to what type  Was the patient referred: Yes    If the patient was referred, are records obtained: Yes.      Has the patient seen a dermatologist in the past: Yes. Records obtained: Yes it was in [facility] 3 or 4 years . Pt denies having records.  Pt asked to call [facility] Dermatology and have records faxed to [phone]. Pt states understanding     Patient Reminders Given:  --Please, make sure you bring an updated list of your medications, allergies and insurance information.  --Plan on being in our facility for approximately one hour, this includes the registration process, office visit, education and check-out process.  If you are having a procedure, more time may be required.     --We are located on the second floor of the building, check in desk D.   --If you need to cancel or reschedule, call [phone].  --We look forward to seeing you in Dermatology Clinic.

## 2018-10-30 ENCOUNTER — OFFICE VISIT (OUTPATIENT)
Dept: DERMATOLOGY | Facility: CLINIC | Age: 43
End: 2018-10-30
Attending: FAMILY MEDICINE
Payer: COMMERCIAL

## 2018-10-30 DIAGNOSIS — L57.0 AK (ACTINIC KERATOSIS): ICD-10-CM

## 2018-10-30 DIAGNOSIS — D18.01 CHERRY ANGIOMA: ICD-10-CM

## 2018-10-30 DIAGNOSIS — L81.4 SOLAR LENTIGO: ICD-10-CM

## 2018-10-30 DIAGNOSIS — D22.9 MULTIPLE BENIGN NEVI: ICD-10-CM

## 2018-10-30 DIAGNOSIS — L57.8 SUN-DAMAGED SKIN: Primary | ICD-10-CM

## 2018-10-30 PROCEDURE — 99203 OFFICE O/P NEW LOW 30 MIN: CPT | Mod: 25 | Performed by: DERMATOLOGY

## 2018-10-30 PROCEDURE — 17000 DESTRUCT PREMALG LESION: CPT | Performed by: DERMATOLOGY

## 2018-10-30 ASSESSMENT — PAIN SCALES - GENERAL: PAINLEVEL: NO PAIN (0)

## 2018-10-30 NOTE — PATIENT INSTRUCTIONS
Cryotherapy    What is it?    Use of a very cold liquid, such as liquid nitrogen, to freeze and destroy abnormal skin cells that need to be removed    What should I expect?    Tenderness and redness    A small blister that might grow and fill with dark purple blood. There may be crusting.    More than one treatment may be needed if the lesions do not go away.    How do I care for the treated area?    Gently wash the area with your hands when bathing.    Use a thin layer of Vaseline to help with healing. You may use a Band-Aid.     The area should heal within 7-10 days and may leave behind a pink or lighter color.     Do not use an antibiotic or Neosporin ointment.     You may take acetaminophen (Tylenol) for pain.     Call your Doctor if you have:    Severe pain    Signs of infection (warmth, redness, cloudy yellow drainage, and or a bad smell)    Questions or concerns    Who should I call with questions?       [facility]: [phone]       [facility]: [phone]       For urgent needs outside of business hours call the [facility] at [phone]        and ask for the dermatology resident on call

## 2018-10-30 NOTE — PROGRESS NOTES
[facility] Dermatology Note      Dermatology Problem List:  1. Mole removed on an upper thigh, per patient  -Records requested by patient prior to visit, have not received  2. AK: Cryo    Encounter Date: Oct 30, 2018    CC:  Chief Complaint   Patient presents with     Skin Check     No particular areas of concern. No personal hx of SC, but family hx of unkown type.          History of Present Illness:  Ms. Cherrie Austin is a 42 year old female who presents as a referral from Dr. Watson for freckling. No particular concerns today. Cherrie lived in [place] for the first several years of her life and obtained a lot of sun damage then. She still spends a lot of time outdoors still. She does not wear sunscreen on a daily basis. Denies any tender, nonhealing, painful skin lesions today. No other concerns addressed today.    Past Medical History:   Patient Active Problem List   Diagnosis     Other kyphoscoliosis and scoliosis     Family history of breast cancer in first degree relative     CARDIOVASCULAR SCREENING; LDL GOAL LESS THAN 160     Right hip pain     Cystic breast     IUD (intrauterine device) in place     Past Medical History:   Diagnosis Date     NO ACTIVE PROBLEMS      Past Surgical History:   Procedure Laterality Date     C SCOLIOSIS SCREENING      surgery age 15       Social History:  Patient  reports that she has never smoked. She has never used smokeless tobacco. She reports that she drinks about 0.5 oz of alcohol per week  She reports that she does not use illicit drugs. Patient works as a per som nurse, and as a forensic sexual assault nurse. She spend part of her childhood in [place], and had a lot of sun exposure.     Family History:  Family History   Problem Relation Age of Onset     Asthma Mother      Breast Cancer Mother 48     Cerebrovascular Disease Maternal Grandmother      Cancer Maternal Grandmother      Colon ca     Cancer Maternal Grandfather      Lung ca      Genitourinary Problems Paternal Grandmother      Cancer Paternal Grandmother      2 Great Aunts with breast cancer 60     Psychotic Disorder Paternal Grandfather      Alzheimer Disease Paternal Grandfather      Psychotic Disorder Brother      Cancer Other      maternal cousin     Alzheimer Disease Other      paternal cousins   Family history of skin cancer, unknown type.     Medications:  Current Outpatient Prescriptions   Medication Sig Dispense Refill     Ascorbic Acid (VITAMIN C PO) Take 2,000 mg by mouth daily       Probiotic Product (PROBIOTIC PO) Take by mouth daily         Allergies   Allergen Reactions     Penicillins      rash       Review of Systems:  -Constitutional: Patient is otherwise feeling well, in usual state of health.   -Skin: As above in HPI. No additional skin concerns.    Physical exam:  Vitals: There were no vitals taken for this visit.  GEN: This is a well developed, well-nourished female in no acute distress, in a pleasant mood.    SKIN: Total skin excluding the undergarment areas was performed. The exam included the head/face, neck, both arms, chest, back, abdomen, both legs, digits and/or nails and buttocks.  -Brunson type I skin   -Numerous brown macules on sun exposed areas. More than expected for age.  -There is an erythematous macule with overyling adherent scale on the right dorsal wrist.  -There are dome shaped bright red papules on the back and trunk.   -Multiple regular brown pigmented macules and papules are identified on the back and trunk.   -No other lesions of concern on areas examined.       Impression/Plan:  1. Severe sun damaged skin with solar lentigines    Recommend sunscreens SPF #30 or greater, protective clothing and avoidance of tanning beds.    Recommended yearly skin checks due to degree of sun damage.     2. Actinic Keratosis, right wrist. Discussed precancerous nature of these lesions. Recommended treatment to prevent progression to a squamous cell carcinoma.      Cryotherapy procedure note: After verbal consent and discussion of risks and benefits including but no limited to dyspigmentation/scar, blister, and pain, 1 was(were) treated with 1-2mm freeze border for 2 cycles with liquid nitrogen. Post cryotherapy instructions were provided.     3. Benign findings including: cherry angiomas    No further intervention required. Patient to report changes.     Patient reassured of the benign nature of these lesions.    4. Multiple clinically benign nevi    No further intervention required. Patient to report changes.     Patient reassured of the benign nature of these lesions.    Follow-up 1 year.       Staff Involved:  Scribe/Staff    Scribe Disclosure  I, Danielle Foote, am serving as a scribe to document services personally performed by Dr. Francine Montero MD, based on data collection and the provider's statements to me.     Provider Disclosure:   The documentation recorded by the scribe accurately reflects the services I personally performed and the decisions made by me.    Francine Montero MD    Department of Dermatology  [facility]: Phone: [phone], Fax:[phone]  [facility]: Phone: [phone], Fax: [phone]
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
## 2018-10-30 NOTE — NURSING NOTE
Cherrie Austin's goals for this visit include:   Chief Complaint   Patient presents with     Skin Check     No particular areas of concern. No personal hx of SC, but family hx of unkown type.      She requests these members of her care team be copied on today's visit information:     PCP: Maia Watson    Referring Provider:  Maia Watson MD  [address] [address]    There were no vitals taken for this visit.    Do you need any medication refills at today's visit? No    Zabrina Finley LPN

## 2018-12-12 ENCOUNTER — OFFICE VISIT (OUTPATIENT)
Dept: AUDIOLOGY | Facility: CLINIC | Age: 43
End: 2018-12-12
Payer: COMMERCIAL

## 2018-12-12 DIAGNOSIS — H93.13 TINNITUS OF BOTH EARS: Primary | ICD-10-CM

## 2018-12-12 NOTE — PROGRESS NOTES
AUDIOLOGY REPORT    SUBJECTIVE:  Cherrie Austin is a 42 year old female who was seen in Audiology at the [facility] for audiologic evaluation, referred by Maia Watson M.D. The patient reports a concerns with hearing bilaterally present for last year. She also reported intermittent tinnitus which occurs every few months and only lasts a few seconds. The patient denies pain, dizziness, significant noise exposure or ear surgeries.  The patient notes difficulty with communication in a variety of listening situations.    OBJECTIVE:  Fall Risk Screen:  1. Have you fallen two or more times in the past year? No  2. Have you fallen and had an injury in the past year? No    Otoscopic exam indicates ears are clear of cerumen bilaterally     Pure Tone Thresholds assessed using conventional audiometry with good  reliability from 250-8000 Hz bilaterally using circumaural headphones     RIGHT:  Normal hearing sensitivity     LEFT:    Normal hearing sensitivity     Tympanogram:    RIGHT: normal eardrum mobility    LEFT:   normal eardrum mobility    Reflexes (reported by stimulus ear):  RIGHT: Ipsilateral is present at normal levels  RIGHT: Contralateral is present at normal levels  LEFT:   Ipsilateral is present at normal levels  LEFT:   Contralateral is present at normal levels      Speech Reception Threshold:    RIGHT: 20 dB HL    LEFT:   15 dB HL  Word Recognition Score:     RIGHT: 100% at 60 dB HL using NU-6 recorded word list.    LEFT:   100% at 60 dB HL using NU-6 recorded word list.      ASSESSMENT:   Today s results were discussed with the patient in detail.     PLAN:  Patient was counseled regarding hearing loss and impact on communication.  Communication strategies were discussed. It is recommended that the patient monitor hearing per concern.  Please call this clinic with questions regarding these results or recommendations.        Bhumi Ring, [facility]  Licensed  Audiologist  [place] License #[id number]

## 2018-12-17 NOTE — TELEPHONE ENCOUNTER
RECORDS STATUS - BREAST    RECORDS RECEIVED FROM: [facility]   DATE RECEIVED: 12/17/18   NOTES STATUS DETAILS   OFFICE NOTE from referring provider EPIC    OFFICE NOTE from medical oncologist NA    OFFICE NOTE from surgeon NA    OFFICE NOTE from radiation oncologist NA    DISCHARGE SUMMARY from hospital NA    DISCHARGE REPORT from the ER NA    OPERATIVE REPORT NA    MEDICATION LIST NA    CLINICAL TRIAL TREATMENTS TO DATE NA    LABS     PATHOLOGY REPORTS  (Tissue diagnosis, Stage, ER/HI percentage positive and intensity of staining, HER2 IHC, FISH, and all biopsies from breast and any distant metastasis)                 NA    GENONOMIC TESTING     TYPE:   (Next Generation Sequencing, including Foundation One testing, and Oncotype score) Epic    IMAGING (NEED IMAGES & REPORT)     CT SCANS NA    MRI NA    MAMMO Epic    ULTRASOUND Epic    PET NA    BONE SCAN NA    BRAIN MRI NA

## 2018-12-19 ENCOUNTER — ONCOLOGY VISIT (OUTPATIENT)
Dept: ONCOLOGY | Facility: CLINIC | Age: 43
End: 2018-12-19
Payer: COMMERCIAL

## 2018-12-19 ENCOUNTER — PRE VISIT (OUTPATIENT)
Dept: ONCOLOGY | Facility: CLINIC | Age: 43
End: 2018-12-19

## 2018-12-19 DIAGNOSIS — Z80.3 FAMILY HISTORY OF BREAST CANCER IN FIRST DEGREE RELATIVE: Primary | ICD-10-CM

## 2018-12-19 PROCEDURE — 96040 ZZC GENETIC COUNSELING, EACH 30 MIN: CPT | Performed by: GENETIC COUNSELOR, MS

## 2018-12-19 NOTE — LETTER
12/19/2018         RE: Cherrie Austin  [address]  [place] [address]        Dear Colleague,    Thank you for referring your patient, Cherrie Austin, to the [facility]. Please see a copy of my visit note below.    12/19/2018    Referring Provider: Maia Watson MD    Presenting Information:   I met with Cherrie Austin today for genetic counseling at the Cancer Risk Management Program at the [facility] to discuss her family history of breast cancer.  She is here alone today to review this history, cancer screening recommendations, and available genetic testing options.    Personal History:  Cherrie is a 42 year old female.  She does not have any personal history of cancer. She had her first menstrual period at age 15, does not have biological children, and is premenopausal.  Cherrie has her ovaries, fallopian tubes and uterus in place. She had her last mammogram in September of this year that was negative. It was noted that she has dense breasts. She reports having annual mammograms since age 37. She has not yet had a colonoscopy. Cherrie reported no tobacco use.    Family History: (Please see scanned pedigree for detailed family history information)    Mother had breast cancer at 48. She had a double-mastectomy. Cherrie reports that after the surgery, it was found that she had bilateral breast cancer. She had chemotherapy and was treated with tamoxifen. She has not had genetic testing.     Maternal grandmother had stomach cancer in her 70s. This was believed to be a result of a stomach surgery that she had earlier in life, though the details of this are unclear.     Maternal grandfather had lung cancer; he was a lifelong smoker.     A paternal first cousin of Cherrie's mother also had breast cancer. She was diagnosed in her 50s.     Two sisters of her father's mother had breast cancer in their 60s (these would be Cherrie's paternal-maternal great aunts). Cherrie's paternal  "grandmother is in her 90s and has no personal history of cancer.     Her maternal ethnicity is Uz[place]. Her paternal ethnicity is Peruvian.  There is no known Ashkenazi Holiness ancestry on either side of her family. She does report that ancestry genetic testing showed some \"Canadian Holiness\" ancestry on her father's side, but that this percentage was very small. There is no reported consanguinity.    Discussion:    Cherrie's maternal family history of breast cancer is suggestive of a hereditary cancer syndrome, given her mother's early-onset bilateral breast cancer. We reviewed the features of sporadic, familial, and hereditary cancers.  We discussed BRCA1/2 today. We briefly reviewed that there are other genes, separate from BRCA1 and BRCA2, that can cause increased risk for breast cancer.     A detailed handout regarding BRCA1/2 and the information we discussed was provided to Cherrie at the end of our appointment today and can be found in the after visit summary.  Topics included: inheritance pattern, cancer risks, cancer screening recommendations, and also risks, benefits and limitations of testing.    We discussed that genetic testing for breast cancer susceptibility genes is typically most informative when it is first performed on a family member with a personal history of breast cancer. In this situation, we discussed that Cherrie's mother would be the best person to test first.  Testing is available to Cherrie, but with limitations. If Cherrie pursues testing at this time and receives a negative result, this does not rule out the possibility of a hereditary cancer syndrome in her or her family.     Given this information, Cherrie would like to discuss testing with her mother, as she thinks her mom would be agreeable to pursuing genetic testing. Cherrie stated her mother gets care at [facility]. I provided Cherrie with a list of genetic counselors at that location, and their contact information from the [facility] website. "     Screening Recommendations:    Based on her personal and family history, Cherrie has a 35.5% lifetime risk of developing breast cancer based on the VIJI (v8) model.  As such, Cherrie meets current National Comprehensive Cancer Network (NCCN) guidelines for high risk breast screening.  This includes annual breast MRI in addition to annual mammogram.  In addition, Cherrie should be receiving clinical breast exams by her physician. We discussed that Cherrie could participate in our Cancer Risk Management Program in which our clinical nurse specialist provides an individual screening plan and assists with medical management. A referral was made to see Suyapa Wallace APRN CNS, for this service. We discussed that these recommendations may change, depending on if her mother has genetic testing, and what testing results show.    Other population cancer screening, such as recommendations by the American Cancer Society and the National Comprehensive Cancer Network (NCCN), are also appropriate for Cherrie and her family.  These screening recommendations may change if there are changes to Cherrie's personal and/or family history.  Final screening recommendations should be made by each individual's managing physician.    Plan:  1) Today Cherrie elected to defer testing for now, and will discuss genetic testing with her mother.  2) Cherrie will follow up with me regarding updates on what her mother chooses. Her mother's decision and/or test results would determine next steps for Cherrie.   3) Referral placed to Suyapa Wallace for high risk breast screening. The scheduling phone number was provided to Cherrie today.    Face to face time: 25 minutes    Maia Kelley MS, [facility]  Licensed Genetic Counselor  P. [phone]  F. [phone]        Again, thank you for allowing me to participate in the care of your patient.        Sincerely,        Maia Kelley, GC

## 2018-12-19 NOTE — PROGRESS NOTES
12/19/2018    Referring Provider: Maia Watson MD    Presenting Information:   I met with Cherrie Austin today for genetic counseling at the Cancer Risk Management Program at the [facility] to discuss her family history of breast cancer.  She is here alone today to review this history, cancer screening recommendations, and available genetic testing options.    Personal History:  Cherrie is a 42 year old female.  She does not have any personal history of cancer. She had her first menstrual period at age 15, does not have biological children, and is premenopausal.  Cherrie has her ovaries, fallopian tubes and uterus in place. She had her last mammogram in September of this year that was negative. It was noted that she has dense breasts. She reports having annual mammograms since age 37. She has not yet had a colonoscopy. Cherrie reported no tobacco use.    Family History: (Please see scanned pedigree for detailed family history information)    Mother had breast cancer at 48. She had a double-mastectomy. Cherrie reports that after the surgery, it was found that she had bilateral breast cancer. She had chemotherapy and was treated with tamoxifen. She has not had genetic testing.     Maternal grandmother had stomach cancer in her 70s. This was believed to be a result of a stomach surgery that she had earlier in life, though the details of this are unclear.     Maternal grandfather had lung cancer; he was a lifelong smoker.     A paternal first cousin of Cherrie's mother also had breast cancer. She was diagnosed in her 50s.     Two sisters of her father's mother had breast cancer in their 60s (these would be Cherrie's paternal-maternal great aunts). Cherrie's paternal grandmother is in her 90s and has no personal history of cancer.     Her maternal ethnicity is Maori. Her paternal ethnicity is Irish.  There is no known Ashkenazi Muslim ancestry on either side of her family. She does report that ancestry genetic  "testing showed some \"Haitian Samaritan\" ancestry on her father's side, but that this percentage was very small. There is no reported consanguinity.    Discussion:    Cherrie's maternal family history of breast cancer is suggestive of a hereditary cancer syndrome, given her mother's early-onset bilateral breast cancer. We reviewed the features of sporadic, familial, and hereditary cancers.  We discussed BRCA1/2 today. We briefly reviewed that there are other genes, separate from BRCA1 and BRCA2, that can cause increased risk for breast cancer.     A detailed handout regarding BRCA1/2 and the information we discussed was provided to Cherrie at the end of our appointment today and can be found in the after visit summary.  Topics included: inheritance pattern, cancer risks, cancer screening recommendations, and also risks, benefits and limitations of testing.    We discussed that genetic testing for breast cancer susceptibility genes is typically most informative when it is first performed on a family member with a personal history of breast cancer. In this situation, we discussed that Cherrie's mother would be the best person to test first.  Testing is available to Cherrie, but with limitations. If Cherrie pursues testing at this time and receives a negative result, this does not rule out the possibility of a hereditary cancer syndrome in her or her family.     Given this information, Cherrie would like to discuss testing with her mother, as she thinks her mom would be agreeable to pursuing genetic testing. Cherrie stated her mother gets care at [facility]. I provided Cherrie with a list of genetic counselors at that location, and their contact information from the [facility] website.     Screening Recommendations:    Based on her personal and family history, Cherrie has a 35.5% lifetime risk of developing breast cancer based on the VIJI (v8) model.  As such, Cherrie meets current National Comprehensive Cancer Network (NCCN) guidelines for high " risk breast screening.  This includes annual breast MRI in addition to annual mammogram.  In addition, Cherrie should be receiving clinical breast exams by her physician. We discussed that Cherrie could participate in our Cancer Risk Management Program in which our clinical nurse specialist provides an individual screening plan and assists with medical management. A referral was made to see MACKENZIE Srivastava, for this service. We discussed that these recommendations may change, depending on if her mother has genetic testing, and what testing results show.    Other population cancer screening, such as recommendations by the American Cancer Society and the National Comprehensive Cancer Network (NCCN), are also appropriate for Cherrie and her family.  These screening recommendations may change if there are changes to Cherrie's personal and/or family history.  Final screening recommendations should be made by each individual's managing physician.    Plan:  1) Today Cherrie elected to defer testing for now, and will discuss genetic testing with her mother.  2) Cherrie will follow up with me regarding updates on what her mother chooses. Her mother's decision and/or test results would determine next steps for Cherrie.   3) Referral placed to Suyapa Wallace for high risk breast screening. The scheduling phone number was provided to Cherrie today.    Face to face time: 25 minutes    Maia Kelley MS, [facility]  Licensed Genetic Counselor  P. [phone]  F. [phone]

## 2018-12-19 NOTE — LETTER
Cancer Risk Management  Program Locations    [facility]  [facility]  Mailing Address  Cancer Risk Management Program  [facility]  [address]  [place] [address]    New patient appointments  [phone]  December 19, 2018    Cherrie Austin  [address]  [place] [address]      Dear Cherrie,    It was a pleasure meeting with you at the [facility] on 12/19.  Here is a copy of the progress note from your recent genetic counseling visit to the Cancer Risk Management Program.  If you have any additional questions, please feel free to call.    Referring Provider: Maia Watson MD    Presenting Information:   I met with Cherrie Burrfabiolaon today for genetic counseling at the Cancer Risk Management Program at the [facility] to discuss her family history of breast cancer.  She is here alone today to review this history, cancer screening recommendations, and available genetic testing options.    Personal History:  Cherrie is a 42 year old female.  She does not have any personal history of cancer. She had her first menstrual period at age 15, does not have biological children, and is premenopausal.  Cherrie has her ovaries, fallopian tubes and uterus in place. She had her last mammogram in September of this year that was negative. It was noted that she has dense breasts. She reports having annual mammograms since age 37. She has not yet had a colonoscopy. Cherrie reported no tobacco use.    Family History: (Please see scanned pedigree for detailed family history information)    Mother had breast cancer at 48. She had a double-mastectomy. Cherrie reports that after the surgery, it was found that she had bilateral breast cancer. She had chemotherapy and was treated with tamoxifen. She has not had  "genetic testing.     Maternal grandmother had stomach cancer in her 70s. This was believed to be a result of a stomach surgery that she had earlier in life, though the details of this are unclear.     Maternal grandfather had lung cancer; he was a lifelong smoker.     A paternal first cousin of Cherrie's mother also had breast cancer. She was diagnosed in her 50s.     Two sisters of her father's mother had breast cancer in their 60s (these would be Cherrie's paternal-maternal great aunts). Cherrie's paternal grandmother is in her 90s and has no personal history of cancer.     Her maternal ethnicity is [place] citizen. Her paternal ethnicity is [place].  There is no known Ashkenazi Alevism ancestry on either side of her family. She does report that ancestry genetic testing showed some \"Bhutanese Alevism\" ancestry on her father's side, but that this percentage was very small. There is no reported consanguinity.    Discussion:    Cherrie's maternal family history of breast cancer is suggestive of a hereditary cancer syndrome, given her mother's early-onset bilateral breast cancer. We reviewed the features of sporadic, familial, and hereditary cancers.  We discussed BRCA1/2 today. We briefly reviewed that there are other genes, separate from BRCA1 and BRCA2, that can cause increased risk for breast cancer.     A detailed handout regarding BRCA1/2 and the information we discussed was provided to Cherrie at the end of our appointment today and can be found in the after visit summary.  Topics included: inheritance pattern, cancer risks, cancer screening recommendations, and also risks, benefits and limitations of testing.    We discussed that genetic testing for breast cancer susceptibility genes is typically most informative when it is first performed on a family member with a personal history of breast cancer. In this situation, we discussed that Cherrie's mother would be the best person to test first.  Testing is available to Cherrie, but with " limitations. If Cherrie pursues testing at this time and receives a negative result, this does not rule out the possibility of a hereditary cancer syndrome in her or her family.     Given this information, Cherrie would like to discuss testing with her mother, as she thinks her mom would be agreeable to pursuing genetic testing. Cherrie stated her mother gets care at [facility]. I provided Cherrie with a list of genetic counselors at that location, and their contact information from the [facility] website.     Screening Recommendations:    Based on her personal and family history, Cherrie has a 35.5% lifetime risk of developing breast cancer based on the VIJI (v8) model.  As such, Cherrie meets current National Comprehensive Cancer Network (NCCN) guidelines for high risk breast screening.  This includes annual breast MRI in addition to annual mammogram.  In addition, Cherrie should be receiving clinical breast exams by her physician. We discussed that Cherrie could participate in our Cancer Risk Management Program in which our clinical nurse specialist provides an individual screening plan and assists with medical management. A referral was made to see MACKENZIE Srivastava, for this service. We discussed that these recommendations may change, depending on if her mother has genetic testing, and what testing results show.    Other population cancer screening, such as recommendations by the American Cancer Society and the National Comprehensive Cancer Network (NCCN), are also appropriate for Cherrie and her family.  These screening recommendations may change if there are changes to Cherrie's personal and/or family history.  Final screening recommendations should be made by each individual's managing physician.    Plan:  1) Today Cherrie elected to defer testing for now, and will discuss genetic testing with her mother.  2) Cherrie will follow up with me regarding updates on what her mother chooses. Her mother's decision and/or test results  would determine next steps for Cherrie.   3) Referral placed to [facility] for high risk breast screening. The scheduling phone number was provided to Cherrie today.    Face to face time: 25 minutes    Maia Kelley MS, [facility]  Licensed Genetic Counselor  P. [phone]  F. [phone]

## 2019-09-30 ENCOUNTER — ANCILLARY PROCEDURE (OUTPATIENT)
Dept: MAMMOGRAPHY | Facility: CLINIC | Age: 44
End: 2019-09-30
Attending: FAMILY MEDICINE
Payer: COMMERCIAL

## 2019-09-30 DIAGNOSIS — Z12.31 VISIT FOR SCREENING MAMMOGRAM: ICD-10-CM

## 2019-09-30 PROCEDURE — 77063 BREAST TOMOSYNTHESIS BI: CPT | Performed by: RADIOLOGY

## 2019-09-30 PROCEDURE — 77067 SCR MAMMO BI INCL CAD: CPT | Performed by: RADIOLOGY

## 2019-11-08 ENCOUNTER — HEALTH MAINTENANCE LETTER (OUTPATIENT)
Age: 44
End: 2019-11-08

## 2020-12-06 ENCOUNTER — HEALTH MAINTENANCE LETTER (OUTPATIENT)
Age: 45
End: 2020-12-06

## 2020-12-11 ENCOUNTER — ANCILLARY PROCEDURE (OUTPATIENT)
Dept: MAMMOGRAPHY | Facility: CLINIC | Age: 45
End: 2020-12-11
Attending: NURSE PRACTITIONER
Payer: COMMERCIAL

## 2020-12-11 DIAGNOSIS — Z12.31 VISIT FOR SCREENING MAMMOGRAM: ICD-10-CM

## 2020-12-11 PROCEDURE — 77067 SCR MAMMO BI INCL CAD: CPT

## 2020-12-11 PROCEDURE — 77063 BREAST TOMOSYNTHESIS BI: CPT

## 2020-12-14 NOTE — RESULT ENCOUNTER NOTE
Jean Claude Grier,     Normal mammogram. Noted dense breast tissue. Repeat mammogram in 1 year.   Thank you,  MACKENZIE Murray, NP-C  [facility]

## 2021-05-12 NOTE — PROGRESS NOTES
SUBJECTIVE:   CC: Cherrie Austin is an 45 year old woman who presents for preventive health visit.       Patient has been advised of split billing requirements and indicates understanding: Yes  Healthy Habits:     Getting at least 3 servings of Calcium per day:  Yes    Bi-annual eye exam:  Yes    Dental care twice a year:  Yes    Sleep apnea or symptoms of sleep apnea:  None    Diet:  Gluten-free/reduced    Frequency of exercise:  6-7 days/week    Duration of exercise:  Greater than 60 minutes    Taking medications regularly:  Not Applicable    Medication side effects:  Not applicable    PHQ-2 Total Score: 0    Additional concerns today:  Yes     (Z00.00) Routine general medical examination at a health care facility  (primary encounter diagnosis)  Comment: Would like a Pap smear today we did discuss that she could go every 5 years between Pap smears.  We will check HPV again today which has been negative  Plan: Pap imaged thin layer screen with HPV -         recommended age 30 - 65 years (select HPV order        below), HPV High Risk Types DNA Cervical            (M25.551) Right hip pain  Comment: Has right hip pain associated with scoliosis.  Has done well with physical therapy but needs a referral she sees a provider [facility]  Plan: PHYSICAL THERAPY REFERRAL            (L81.2) Yves  Comment: Ildefonso peralta has been advised to see dermatology  Plan: DERMATOLOGY ADULT REFERRAL            (Z80.3) Family history of breast cancer in first degree relative  Comment: Her mother had breast cancer at the age of 48.  She has talked with genetics in the past but never followed up.  I did encourage her to reconsider this.  Her mother never had BRCA testing and I recommend that this to her.  Plan: CANCER RISK MGMT/CANCER GENETIC COUNSELING         REFERRAL                        Today's PHQ-2 Score:   PHQ-2 ( 1999 [employer]) 5/2/2018   Q1: Little interest or pleasure in doing things 0   Q2: Feeling down,  depressed or hopeless 0   PHQ-2 Score 0   Q1: Little interest or pleasure in doing things Not at all   Q2: Feeling down, depressed or hopeless Not at all   PHQ-2 Score 0       Abuse: Current or Past (Physical, Sexual or Emotional) - No  Do you feel safe in your environment? Yes    Have you ever done Advance Care Planning? (For example, a Health Directive, POLST, or a discussion with a medical provider or your loved ones about your wishes):     Social History     Tobacco Use     Smoking status: Never Smoker     Smokeless tobacco: Never Used   Substance Use Topics     Alcohol use: Yes     Alcohol/week: 0.8 standard drinks     Comment: 1-2 drinks a month         Alcohol Use 5/2/2018   Prescreen: >3 drinks/day or >7 drinks/week? No   Prescreen: >3 drinks/day or >7 drinks/week? -       Reviewed orders with patient.  Reviewed health maintenance and updated orders accordingly - Yes  Lab work is in process  Labs reviewed in EPIC    Breast Cancer Screening:  Any new diagnosis of family breast, ovarian, or bowel cancer? Yes       FSH-7: No flowsheet data found.    Mammogram Screening - Offered annual screening and updated Health Maintenance for mutual plan based on risk factor consideration    Pertinent mammograms are reviewed under the imaging tab.    History of abnormal Pap smear: NO - age 30-65 PAP every 5 years with negative HPV co-testing recommended  PAP / HPV Latest Ref Rng & Units 5/2/2018 3/25/2014 1/19/2010   PAP - NIL NIL NIL   HPV 16 DNA NEG:Negative Negative - -   HPV 18 DNA NEG:Negative Negative - -   OTHER HR HPV NEG:Negative Negative - -     Reviewed and updated as needed this visit by clinical staff                 Reviewed and updated as needed this visit by Provider                Past Medical History:   Diagnosis Date     NO ACTIVE PROBLEMS       Past Surgical History:   Procedure Laterality Date     C SCOLIOSIS SCREENING      surgery age 15       Review of Systems  CONSTITUTIONAL: NEGATIVE for fever,  "chills, change in weight  INTEGUMENTARU/SKIN: NEGATIVE for worrisome rashes, moles or lesions  EYES: NEGATIVE for vision changes or irritation  ENT: NEGATIVE for ear, mouth and throat problems  RESP: NEGATIVE for significant cough or SOB  BREAST: NEGATIVE for masses, tenderness or discharge  CV: NEGATIVE for chest pain, palpitations or peripheral edema  GI: NEGATIVE for nausea, abdominal pain, heartburn, or change in bowel habits  : NEGATIVE for unusual urinary or vaginal symptoms. Periods are regular.  MUSCULOSKELETAL: NEGATIVE for significant arthralgias or myalgia  NEURO: NEGATIVE for weakness, dizziness or paresthesias  PSYCHIATRIC: NEGATIVE for changes in mood or affect     OBJECTIVE:   BP 96/62 (BP Location: Right arm, Cuff Size: Adult Regular)   Pulse 59   Temp 98  F (36.7  C) (Oral)   Resp 16   Ht 1.6 m (5' 3\")   Wt 55.8 kg (123 lb)   SpO2 98%   BMI 21.79 kg/m    Physical Exam  GENERAL: healthy, alert and no distress  EYES: Eyes grossly normal to inspection, PERRL and conjunctivae and sclerae normal  HENT: ear canals and TM's normal, nose and mouth without ulcers or lesions  NECK: no adenopathy, no asymmetry, masses, or scars and thyroid normal to palpation  RESP: lungs clear to auscultation - no rales, rhonchi or wheezes  BREAST: normal without masses, tenderness or nipple discharge and no palpable axillary masses or adenopathy  CV: regular rate and rhythm, normal S1 S2, no S3 or S4, no murmur, click or rub, no peripheral edema and peripheral pulses strong  ABDOMEN: soft, nontender, no hepatosplenomegaly, no masses and bowel sounds normal   (female): normal female external genitalia, normal urethral meatus, vaginal mucosa pink, moist, well rugated, and normal cervix/adnexa/uterus without masses or discharge  MS: no gross musculoskeletal defects noted, no edema slight angulation due to scoliosis visible scars on her back from procedure  SKIN: no suspicious lesions or rashes numerous freckles " "noted  NEURO: Normal strength and tone, mentation intact and speech normal  PSYCH: mentation appears normal, affect normal/bright    Diagnostic Test Results:  Labs reviewed in Epic    ASSESSMENT/PLAN:   1. Routine general medical examination at a health care facility     - Pap imaged thin layer screen with HPV - recommended age 30 - 65 years (select HPV order below)  - HPV High Risk Types DNA Cervical    2. Right hip pain     - PHYSICAL THERAPY REFERRAL    3. Freckles     - DERMATOLOGY ADULT REFERRAL; Future    4. Family history of breast cancer in first degree relative  Yearly mammograms encouraged and referral to genetics for discussion of labs  - CANCER RISK MGMT/CANCER GENETIC COUNSELING REFERRAL    Patient has been advised of split billing requirements and indicates understanding: Yes  COUNSELING:  Reviewed preventive health counseling, as reflected in patient instructions       Regular exercise       Healthy diet/nutrition    Estimated body mass index is 20.61 kg/m  as calculated from the following:    Height as of 5/2/18: 1.613 m (5' 3.5\").    Weight as of 5/2/18: 53.6 kg (118 lb 3.2 oz).        She reports that she has never smoked. She has never used smokeless tobacco.      Counseling Resources:  ATP IV Guidelines  Pooled Cohorts Equation Calculator  Breast Cancer Risk Calculator  BRCA-Related Cancer Risk Assessment: FHS-7 Tool  FRAX Risk Assessment  ICSI Preventive Guidelines  Dietary Guidelines for Americans, 2010  USDA's MyPlate  ASA Prophylaxis  Lung CA Screening    Maia Watson MD  [facility] [place]  "

## 2021-05-13 ENCOUNTER — OFFICE VISIT (OUTPATIENT)
Dept: FAMILY MEDICINE | Facility: CLINIC | Age: 46
End: 2021-05-13
Payer: COMMERCIAL

## 2021-05-13 VITALS
RESPIRATION RATE: 16 BRPM | DIASTOLIC BLOOD PRESSURE: 62 MMHG | OXYGEN SATURATION: 98 % | WEIGHT: 123 LBS | BODY MASS INDEX: 21.79 KG/M2 | TEMPERATURE: 98 F | HEIGHT: 63 IN | HEART RATE: 59 BPM | SYSTOLIC BLOOD PRESSURE: 96 MMHG

## 2021-05-13 DIAGNOSIS — Z00.00 ROUTINE GENERAL MEDICAL EXAMINATION AT A HEALTH CARE FACILITY: Primary | ICD-10-CM

## 2021-05-13 DIAGNOSIS — M25.551 RIGHT HIP PAIN: ICD-10-CM

## 2021-05-13 DIAGNOSIS — L81.2 FRECKLES: ICD-10-CM

## 2021-05-13 DIAGNOSIS — Z80.3 FAMILY HISTORY OF BREAST CANCER IN FIRST DEGREE RELATIVE: ICD-10-CM

## 2021-05-13 PROCEDURE — G0145 SCR C/V CYTO,THINLAYER,RESCR: HCPCS | Performed by: FAMILY MEDICINE

## 2021-05-13 PROCEDURE — 87624 HPV HI-RISK TYP POOLED RSLT: CPT | Performed by: FAMILY MEDICINE

## 2021-05-13 PROCEDURE — 99386 PREV VISIT NEW AGE 40-64: CPT | Performed by: FAMILY MEDICINE

## 2021-05-13 ASSESSMENT — MIFFLIN-ST. JEOR: SCORE: 1182.05

## 2021-05-13 NOTE — NURSING NOTE
"Chief Complaint   Patient presents with     Physical     initial BP 96/62 (BP Location: Right arm, Cuff Size: Adult Regular)   Pulse 59   Temp 98  F (36.7  C) (Oral)   Resp 16   Ht 1.6 m (5' 3\")   Wt 55.8 kg (123 lb)   SpO2 98%   BMI 21.79 kg/m   Estimated body mass index is 21.79 kg/m  as calculated from the following:    Height as of this encounter: 1.6 m (5' 3\").    Weight as of this encounter: 55.8 kg (123 lb).  BP completed using cuff size: regular.  L  R arm      Health Maintenance that is potentially due pending provider review:  NONE    PAP--Possibly completing today, per Provider review    Paulo Garcia ma  "

## 2021-05-17 LAB
COPATH REPORT: NORMAL
PAP: NORMAL

## 2021-06-21 ENCOUNTER — VIRTUAL VISIT (OUTPATIENT)
Dept: ONCOLOGY | Facility: CLINIC | Age: 46
End: 2021-06-21
Attending: FAMILY MEDICINE
Payer: COMMERCIAL

## 2021-06-21 DIAGNOSIS — Z80.0 FAMILY HISTORY OF MALIGNANT NEOPLASM OF PANCREAS: ICD-10-CM

## 2021-06-21 DIAGNOSIS — Z80.3 FAMILY HISTORY OF MALIGNANT NEOPLASM OF BREAST: Primary | ICD-10-CM

## 2021-06-21 DIAGNOSIS — Z80.0 FAMILY HISTORY OF MALIGNANT NEOPLASM OF STOMACH: ICD-10-CM

## 2021-06-21 PROCEDURE — 999N000069 HC STATISTIC GENETIC COUNSELING, < 16 MIN: Mod: GT | Performed by: GENETIC COUNSELOR, MS

## 2021-06-21 NOTE — LETTER
Cherrie Norman  [address]  [place] [address]    Assessing Cancer Risk  Only about 5-10% of cancers are thought to be due to an inherited cancer susceptibility gene.    These families often have:    Several people with the same or related types of cancer    Cancers diagnosed at a young age (before age 50)    Individuals with more than one primary cancer    Multiple generations of the family affected with cancer    Some people may be candidates for genetic testing of more than one gene.  For these families, genetic testing using a cancer panel may be offered.  These panels will test different genes known to increase the risk for breast, ovarian, uterine, and/or other cancers. All of the genes discussed below have published clinical management guidelines for individuals who are found to carry a mutation. The purpose of this handout is to serve as a brief summary of the genes analyzed by the panels used to inquire about hereditary breast and gynecologic cancer:  KIMBERLY, BRCA1, BRCA2, BRIP1, CDH1, CHEK2, MLH1, MSH2, MSH6, PMS2, EPCAM, PTEN, PALB2, RAD51C, RAD51D, and TP53.  ______________________________________________________________________________  Hereditary Breast and Ovarian Cancer Syndrome   (BRCA1 and BRCA2)  A single mutation in one of the copies of BRCA1 or BRCA2 increases the risk for breast and ovarian cancer, among others.  The risk for pancreatic cancer and melanoma may also be slightly increased in some families.  The chart below shows the chance that someone with a BRCA mutation would develop cancer in his or her lifetime1,2,3,4.        A person s ethnic background is also important to consider, as individuals of Ashkenazi Mandaeism ancestry have a higher chance of having a BRCA gene mutation.  There are three BRCA mutations that occur more frequently in this population.    Pack Syndrome   (MLH1, MSH2, MSH6, PMS2, and EPCAM)  Currently five genes are known to cause Pack Syndrome: MLH1, MSH2,  MSH6, PMS2, and EPCAM.  A single mutation in one of the Pack Syndrome genes increases the risk for colon, endometrial, ovarian, and stomach cancers.  Other cancers that occur less commonly in Pack Syndrome include urinary tract, skin, and brain cancers.  The chart below shows the chance that a person with Pack syndrome would develop cancer in his or her lifetime5.      *Cancer risk varies depending on Pack syndrome gene found    Cowden Syndrome   (PTEN)  Cowden syndrome is a hereditary condition that increases the risk for breast, thyroid, endometrial, colon, and kidney cancer.  Cowden syndrome is caused by a mutation in the PTEN gene.  A single mutation in one of the copies of PTEN causes Cowden syndrome and increases cancer risk.  The chart below shows the chance that someone with a PTEN mutation would develop cancer in their lifetime6,7.  Other benign features seen in some individuals with Cowden syndrome include benign skin lesions (facial papules, keratoses, lipomas), learning disability, autism, thyroid nodules, colon polyps, and larger head size.      *One recent study found breast cancer risk to be increased to 85%    Li-Fraumeni Syndrome   (TP53)  Li-Fraumeni Syndrome (LFS) is a cancer predisposition syndrome caused by a mutation in the TP53 gene. A single mutation in one of the copies of TP53 increases the risk for multiple cancers. Individuals with LFS are at an increased risk for developing cancer at a young age. The lifetime risk for development of a LFS-associated cancer is 50% by age 30 and 90% by age 60.   Core Cancers: Sarcomas, Breast, Brain, Lung, Leukemias/Lymphomas, Adrenocortical carcinomas  Other Cancers: Gastrointestinal, Thyroid, Skin, Genitourinary    Hereditary Diffuse Gastric Cancer   (CDH1)  Currently, one gene is known to cause hereditary diffuse gastric cancer (HDGC): CDH1.  Individuals with HDGC are at increased risk for diffuse gastric cancer and lobular breast cancer. Of people  diagnosed with HDGC, 30-50% have a mutation in the CDH1 gene.  This suggests there are likely other genes that may cause HDGC that have not been identified yet.      Lifetime Cancer Risks    General Population HDGC    Diffuse Gastric  <1% ~80%   Breast 12% 39-52%           Additional Genes  KIMBERLY  KIMBERLY is a moderate-risk breast cancer gene. Women who have a mutation in KIMBERLY can have between a 2-4 fold increased risk for breast cancer compared to the general population8. KIMBERLY mutations have also been associated with increased risk for pancreatic cancer, however an estimate of this cancer risk is not well understood9. Individuals who inherit two KIMBERLY mutations have a condition called ataxia-telangiectasia (AT).  This rare autosomal recessive condition affects the nervous system and immune system, and is associated with progressive cerebellar ataxia beginning in childhood.  Individuals with ataxia-telangiectasia often have a weakened immune system and have an increased risk for childhood cancers.    PALB2  Mutations in PALB2 have been shown to increase the risk of breast cancer up to 33-58% in some families; where individuals fall within this risk range is dependent upon family eezkiio21. PALB2 mutations have also been associated with increased risk for pancreatic cancer, although this risk has not been quantified yet.  Individuals who inherit two PALB2 mutations--one from their mother and one from their father--have a condition called Fanconi Anemia.  This rare autosomal recessive condition is associated with short stature, developmental delay, bone marrow failure, and increased risk for childhood cancers.    CHEK2   CHEK2 is a moderate-risk breast cancer gene.  Women who have a mutation in CHEK2 have around a 2-fold increased risk for breast cancer compared to the general population, and this risk may be higher depending upon family history.11,12,13 Mutations in CHEK2 have also been shown to increase the risk of a number  of other cancers, including colon and prostate, however these cancer risks are currently not well understood.    BRIP1, RAD51C and RAD51D  Mutations in BRIP1, RAD51C, and RAD51D have been shown to increase the risk of ovarian cancer and possibly female breast cancer as well14,15 .       Lifetime Cancer Risk    General Population BRIP1 RAD51C RAD51D   Ovarian 1-2% ~5-8% ~5-9% ~7-15%           Inheritance  All of the cancer syndromes reviewed above are inherited in an autosomal dominant pattern.  This means that if a parent has a mutation, each of his or her children will have a 50% chance of inheriting that same mutation.  Therefore, each child--male or female--would have a 50% chance of being at increased risk for developing cancer.      Image obtained from [employer], 2013     Mutations in some genes can occur de reyes, which means that a person s mutation occurred for the first time in them and was not inherited from a parent.  Now that they have the mutation, however, it can be passed on to future generations.    Genetic Testing  Genetic testing involves a blood test and will look at the genetic information in the KIMBERLY, BRCA1, BRCA2, BRIP1, CDH1, CHEK2, MLH1, MSH2, MSH6, PMS2, EPCAM, PTEN, PALB2, RAD51C, RAD51D, and TP53 genes for any harmful mutations that are associated with increased cancer risk.  If possible, it is recommended that the person(s) who has had cancer be tested before other family members.  That person will give us the most useful information about whether or not a specific gene is associated with the cancer in the family.    Results  There are three possible results of genetic testing:    Positive--a harmful mutation was identified in one or more of the genes    Negative--no mutation was identified in any of the genes on this panel    Variant of unknown significance--a variation in one of the genes was identified, but it is unclear how this impacts cancer risk in the  family    Advantages and Disadvantages   There are advantages and disadvantages to genetic testing.    Advantages    May clarify your cancer risk    Can help you make medical decisions    May explain the cancers in your family    May give useful information to your family members (if you share your results)    Disadvantages    Possible negative emotional impact of learning about inherited cancer risk    Uncertainty in interpreting a negative test result in some situations    Possible genetic discrimination concerns (see below)    Genetic Information Nondiscrimination Act (JADE)  JADE is a federal law that protects individuals from health insurance or employment discrimination based on a genetic test result alone.  Although rare, there are currently no legal discrimination protections in terms of life insurance, long term care, or disability insurances.  Visit the [employer] [place] website to learn more.    Reducing Cancer Risk  All of the genes described above have nationally recognized cancer screening guidelines that would be recommended for individuals who test positive.  In addition to increased cancer screening, surgeries may be offered or recommended to reduce cancer risk.  Recommendations are based upon an individual s genetic test result as well as their personal and family history of cancer.    Questions to Think About Regarding Genetic Testing:    What effect will the test result have on me and my relationship with my family members if I have an inherited gene mutation?  If I don t have a gene mutation?    Should I share my test results, and how will my family react to this news, which may also affect them?    Are my children ready to learn new information that may one day affect their own health?    Hereditary Cancer Resources    FORCE: Facing Our Risk of Cancer Empowered facingourrisk.org   Bright Pink bebrightpink.org   Li-Fraumeni Syndrome Association lfsassociation.org   PTEN  World PTENworld.com   No stomach for cancer, Inc. nostomachforcancer.org   Stomach cancer relief network Scrnet.org   Collaborative Group of the Americas on Inherited Colorectal Cancer (CGA) cgaicc.com    Cancer Care cancercare.org   American Cancer Society (ACS) cancer.org   National Cancer [place] (NCI) cancer.gov     Please call us if you have any questions or concerns.   Cancer Risk Management Program [phone]-CANCER ([phone])  ? Gio Barahona, MS, [facility] [phone]  ? Barbara Butler, MS, [facility]  [phone]  ? Olivia Ross, MS, [facility]  [phone]  ? Isela Bhupinder, MS, [facility] [phone]  ? Brittney Mickie, MS, [facility] [phone]  ? Cami Santa, MS, [facility]  [phone]    References  1. Dhruv A, Chuck PDP, Michele S, Chris THORNE, Gilmer JE, Alberto JL, Erika N, Doris H, Yudelka O, Juan A, Nabeelini B, Radijason P, Manoukikunal S, Marques DM, Liao N, Karie E, Jah H, Aubrey E, Jimmy J, Gronkale J, Brayan B, Jamel H, Thorlacius S, Eerola H, Nevkunallinna H, Wilver K, Caitlin OP. Average risks of breast and ovarian cancer associated with BRCA1 or BRCA2 mutations detected in case series unselected for family history: a combined analysis of 222 studies. Am J Hum Gloria. 2003;72:1117-30.  2. Kavita N, Anita M, Raygoza G.  BRCA1 and BRCA2 Hereditary Breast and Ovarian Cancer. Gene Reviews online. 2013.  3. Delfin YC, Monica S, Jennifer G, Castillo S. Breast cancer risk among male BRCA1 and BRCA2 mutation carriers. J Natl Cancer Inst. 2007;99:1811-4.  4. Randy DG, Jhonny I, Jacob J, Hamilton E, Tashi ER, Devang F. Risk of breast cancer in male BRCA2 carriers. J Med Gloria. 2010;47:710-1.  5. National Comprehensive Cancer Network. Clinical practice guidelines in oncology, colorectal cancer screening. Available online (registration required). 2015.  6. Rafael PRADHAN, Rich J, Alejandra J, Umer VEGA, Stanton RICK, Naty C. Lifetime cancer risks in individuals with germline PTEN mutations. Clin Cancer Res.  2012;18:400-7.  7. Ines GUARDADO. Cowden Syndrome: A Critical Review of the Clinical Literature. J Gloria . 2009:18:13-27.  8. Harish PLASENCIA, Angel D, Elise S, Susy P, Emily T, Annel M, Dejan B, Gen H, Dilcia R, Mariola K, Kris L, Randy DG, Marques D, Bin DF, Kimberly MR, The Breast Cancer Susceptibility Collaboration (UK) & Ally PANDYA. KIMBERLY mutations that cause ataxia-telangiectasia are breast cancer susceptibility alleles. Nature Genetics. 2006;38:873-875  9. Zaid N , Christine Y, Lanie J, Yasmany L, Yamile GM , Melvin ML, Gallinger S, Morelos AG, Syngal S, Rossy ML, Dave J , Alicia R, Marcia SZ, Charli JR, Sawyer VE, Marin M, Vogelstein B, Natacha N, Ghassan RH, Victorina KW, and Suzanne AP. KIMBERLY mutations in patients with hereditary pancreatic cancer. Cancer Discover. 2012;2:41-46  10. Dhruv HAGEN, et al. Breast-Cancer Risk in Families with Mutations in PALB2. NEJM. 2014; 371(6):497-506.  11. CHEK2 Breast Cancer Case-Control Consortium. CHEK2*1100delC and susceptibility to breast cancer: A collaborative analysis involving 10,860 breast cancer cases and 9,065 controls from 10 studies. Am J Hum Gloria, 74 (2004), pp. 9836-8734  12. Rosana T, Enid S, Mae K, et al. Spectrum of Mutations in BRCA1, BRCA2, CHEK2, and TP53 in Families at High Risk of Breast Cancer. MAX. 2006;295(12):6810-8189.   13. Yarelis BALTAZAR, Rodrigo ABEL, Mandi A, et al. Risk of breast cancer in women with a CHEK2 mutation with and without a family history of breast cancer. J Clin Oncol. 2011;29:9852-6869.  14. Mahendra H, Jacob E, Fabi SJ, et al. Contribution of germline mutations in the RAD51B, RAD51C, and RAD51D genes to ovarian cancer in the population. J Clin Oncol. 2015;33(26):8991-0140. Doi:10.1200/JCO.2015.61.2408.  15. Peter T, Ray ANDUJAR, Ebony P, et al. Mutations in BRIP1 confer high risk of ovarian cancer. Dianne Gloria. 2011;43(11):5811-0750. doi:10.1038/ng.955.

## 2021-06-21 NOTE — LETTER
Cancer Risk Management  Program Locations    [facility]  [facility]  Mailing Address  Cancer Risk Management Program  [address]  [place] [address]    New patient appointments  [phone]  June 21, 2021    Cherrie Austin  [address]  [place] [address]      Dear Cherrie,    It was a pleasure speaking with you over video on 6/21/2021. Here is a copy of the progress note from our discussion. If you have any additional questions, please feel free to call.    Referring Provider: Maia Watson MD    Presenting Information:   Given concerns regarding the potential for COVID-19 exposure during a clinic visit, Cherrie elected for a video genetic counseling visit through the Cancer Risk Management Program to discuss her family history of breast, stomach, and pancreatic cancer. Today we reviewed this history, cancer screening recommendations, and available genetic testing options.    Personal History:  Cherrie is a 45 year old year old female. She does not have any personal history of cancer. She had her first menstrual period at age 15, she does not have children, and is premenopausal. Cherrie has her ovaries, fallopian tubes and uterus in place, and she has had no ovarian cancer screening to date. She reports that she has not used hormone replacement therapy. She has annual clinical breast exams and mammograms; her most recent mammogram in December 2020 was normal. Cherrie has not had a colonoscopy. She does not regularly do any other cancer screening at this time.     Family History: (Please see scanned pedigree for detailed family history information). Please see updated family history in bold.  ? Mother had breast cancer at 48. She had a double-mastectomy. Cherrie reports that after the surgery, it was  "found that she had bilateral breast cancer. She had chemotherapy and was treated with tamoxifen. She has not had genetic testing.   ? Maternal grandmother had stomach cancer in her 70s. This was believed to be a result of a stomach surgery that she had earlier in life, though the details of this are unclear.   ? Maternal grandfather had lung cancer; he was a lifelong smoker.   ? A paternal first cousin of Cherrie's mother also had breast cancer. She was diagnosed in her 50s.   ? Cherrie's paternal grandmother had two sisters diagnosed with breast cancer in their 60s. One of which was recently diagnosed with pancreatic cancer in her late 80s.    Her maternal ethnicity is Sinhala. Her paternal ethnicity is Welsh.  There is no known Ashkenazi Confucianist ancestry on either side of her family. She does report that ancestry genetic testing showed some \"[place] Confucianist\" ancestry on her father's side, but that this percentage was very small. There is no reported consanguinity.    Discussion:    We discussed the information from her previous appointment in 2018. At that time, it was recommended that Cherrie talk to her mother about pursuing genetic testing as she was the person affected with cancer. Cherrie stated that she never talked to her mother about getting genetic testing after her last appointment.    We discussed that if her mother had positive genetic testing, Cherrie would be at a 50% chance of inheriting the mutation her mother has. If her mother tested negative, she would not be at risk to inherited mutations in these genes from her mother as her mother would not have any mutations in these genes to pass onto her.    We discussed that the family history of cancer on dad's side of the family. We discussed that the relatives on that side were more distantly related and there are multiple unaffected relatives as well. This means that we are less likely to identify a hereditary cause for these cancers in Cherrie. However, if Cherrie " were to pursue genetic testing, we could also test for the genes associated with the cancers on her paternal side of the family.    We discussed that given the family history on both sides of the family, Cherrie could consider genetic testing herself if she was interested. Cherrie elected to talk to her mother about getting genetic testing first. We discussed that Cherrie should contact me with her mother's results, once they are complete, so that I am able to provide a better risk assessment for her.    Screening Recommendations:     Based on her personal and family history, Cherrie has a 35.4% lifetime risk of developing breast cancer based on the VIJI model. As such, Cherrie meets current National Comprehensive Cancer Network (NCCN) guidelines for high risk breast screening. This includes annual breast MRI in addition to annual mammogram beginning at age 38. In addition, Cherrie should be receiving clinical breast exams by her physician. We discussed that Cherrie could participate in our Cancer Risk Management Program in which our Physician Assistant provides an individual screening plan and assists with medical management. Cherrie was not interested in this screening at this time. I encouraged Cherrie to contact me should she wish to be seen by our Physician Assistant for this screening in the future.     Other population cancer screening options, such as those recommended by the American Cancer Society and the National Comprehensive Cancer Network (NCCN), are also appropriate for Cherrie and her family. These screening recommendations may change if there are changes to Cherrie's personal and/or family history of cancer. Final screening recommendations should be made by each individual's managing physician.    Plan:  1) Today Cherrie elected to defer genetic testing for now and she will discuss genetic testing with her mother.  2) Cherrie will contact me if her mother pursues genetic testing or Cherrie becomes interested in genetic testing  herself. If her mother pursues genetic testing, Cherrie should contact me to determine next steps and if there are any different screening recommendations for her. My contact information was provided.    Time spent on video: 14 minutes    Gio Barahona MS, [facility]  Licensed, Certified Genetic Counselor  (P): [phone]  (F): [phone]

## 2021-06-22 NOTE — PATIENT INSTRUCTIONS
Assessing Cancer Risk  Only about 5-10% of cancers are thought to be due to an inherited cancer susceptibility gene.    These families often have:    Several people with the same or related types of cancer    Cancers diagnosed at a young age (before age 50)    Individuals with more than one primary cancer    Multiple generations of the family affected with cancer    Some people may be candidates for genetic testing of more than one gene.  For these families, genetic testing using a cancer panel may be offered.  These panels will test different genes known to increase the risk for breast, ovarian, uterine, and/or other cancers. All of the genes discussed below have published clinical management guidelines for individuals who are found to carry a mutation. The purpose of this handout is to serve as a brief summary of the genes analyzed by the panels used to inquire about hereditary breast and gynecologic cancer:  KIMBERLY, BRCA1, BRCA2, BRIP1, CDH1, CHEK2, MLH1, MSH2, MSH6, PMS2, EPCAM, PTEN, PALB2, RAD51C, RAD51D, and TP53.  ______________________________________________________________________________  Hereditary Breast and Ovarian Cancer Syndrome   (BRCA1 and BRCA2)  A single mutation in one of the copies of BRCA1 or BRCA2 increases the risk for breast and ovarian cancer, among others.  The risk for pancreatic cancer and melanoma may also be slightly increased in some families.  The chart below shows the chance that someone with a BRCA mutation would develop cancer in his or her lifetime1,2,3,4.        A person s ethnic background is also important to consider, as individuals of Ashkenazi Mormonism ancestry have a higher chance of having a BRCA gene mutation.  There are three BRCA mutations that occur more frequently in this population.    Pack Syndrome   (MLH1, MSH2, MSH6, PMS2, and EPCAM)  Currently five genes are known to cause Pack Syndrome: MLH1, MSH2, MSH6, PMS2, and EPCAM.  A single mutation in one of the  Pack Syndrome genes increases the risk for colon, endometrial, ovarian, and stomach cancers.  Other cancers that occur less commonly in Pack Syndrome include urinary tract, skin, and brain cancers.  The chart below shows the chance that a person with Pack syndrome would develop cancer in his or her lifetime5.      *Cancer risk varies depending on Pack syndrome gene found    Cowden Syndrome   (PTEN)  Cowden syndrome is a hereditary condition that increases the risk for breast, thyroid, endometrial, colon, and kidney cancer.  Cowden syndrome is caused by a mutation in the PTEN gene.  A single mutation in one of the copies of PTEN causes Cowden syndrome and increases cancer risk.  The chart below shows the chance that someone with a PTEN mutation would develop cancer in their lifetime6,7.  Other benign features seen in some individuals with Cowden syndrome include benign skin lesions (facial papules, keratoses, lipomas), learning disability, autism, thyroid nodules, colon polyps, and larger head size.      *One recent study found breast cancer risk to be increased to 85%    Li-Fraumeni Syndrome   (TP53)  Li-Fraumeni Syndrome (LFS) is a cancer predisposition syndrome caused by a mutation in the TP53 gene. A single mutation in one of the copies of TP53 increases the risk for multiple cancers. Individuals with LFS are at an increased risk for developing cancer at a young age. The lifetime risk for development of a LFS-associated cancer is 50% by age 30 and 90% by age 60.   Core Cancers: Sarcomas, Breast, Brain, Lung, Leukemias/Lymphomas, Adrenocortical carcinomas  Other Cancers: Gastrointestinal, Thyroid, Skin, Genitourinary    Hereditary Diffuse Gastric Cancer   (CDH1)  Currently, one gene is known to cause hereditary diffuse gastric cancer (HDGC): CDH1.  Individuals with HDGC are at increased risk for diffuse gastric cancer and lobular breast cancer. Of people diagnosed with HDGC, 30-50% have a mutation in the CDH1  gene.  This suggests there are likely other genes that may cause HDGC that have not been identified yet.      Lifetime Cancer Risks    General Population HDGC    Diffuse Gastric  <1% ~80%   Breast 12% 39-52%         Additional Genes  KIMBERLY  KIMBERLY is a moderate-risk breast cancer gene. Women who have a mutation in KIMBERLY can have between a 2-4 fold increased risk for breast cancer compared to the general population8. KIMBERLY mutations have also been associated with increased risk for pancreatic cancer, however an estimate of this cancer risk is not well understood9. Individuals who inherit two KIMBERLY mutations have a condition called ataxia-telangiectasia (AT).  This rare autosomal recessive condition affects the nervous system and immune system, and is associated with progressive cerebellar ataxia beginning in childhood.  Individuals with ataxia-telangiectasia often have a weakened immune system and have an increased risk for childhood cancers.    PALB2  Mutations in PALB2 have been shown to increase the risk of breast cancer up to 33-58% in some families; where individuals fall within this risk range is dependent upon family [id number]. PALB2 mutations have also been associated with increased risk for pancreatic cancer, although this risk has not been quantified yet.  Individuals who inherit two PALB2 mutations--one from their mother and one from their father--have a condition called Fanconi Anemia.  This rare autosomal recessive condition is associated with short stature, developmental delay, bone marrow failure, and increased risk for childhood cancers.    CHEK2   CHEK2 is a moderate-risk breast cancer gene.  Women who have a mutation in CHEK2 have around a 2-fold increased risk for breast cancer compared to the general population, and this risk may be higher depending upon family history.11,12,13 Mutations in CHEK2 have also been shown to increase the risk of a number of other cancers, including colon and prostate, however  these cancer risks are currently not well understood.    BRIP1, RAD51C and RAD51D  Mutations in BRIP1, RAD51C, and RAD51D have been shown to increase the risk of ovarian cancer and possibly female breast cancer as well14,15 .       Lifetime Cancer Risk    General Population BRIP1 RAD51C RAD51D   Ovarian 1-2% ~5-8% ~5-9% ~7-15%           Inheritance  All of the cancer syndromes reviewed above are inherited in an autosomal dominant pattern.  This means that if a parent has a mutation, each of his or her children will have a 50% chance of inheriting that same mutation.  Therefore, each child--male or female--would have a 50% chance of being at increased risk for developing cancer.      Image obtained from Genetics Home Reference, 2013     Mutations in some genes can occur de reyes, which means that a person s mutation occurred for the first time in them and was not inherited from a parent.  Now that they have the mutation, however, it can be passed on to future generations.    Genetic Testing  Genetic testing involves a blood test and will look at the genetic information in the KIMBERLY, BRCA1, BRCA2, BRIP1, CDH1, CHEK2, MLH1, MSH2, MSH6, PMS2, EPCAM, PTEN, PALB2, RAD51C, RAD51D, and TP53 genes for any harmful mutations that are associated with increased cancer risk.  If possible, it is recommended that the person(s) who has had cancer be tested before other family members.  That person will give us the most useful information about whether or not a specific gene is associated with the cancer in the family.    Results  There are three possible results of genetic testing:    Positive--a harmful mutation was identified in one or more of the genes    Negative--no mutation was identified in any of the genes on this panel    Variant of unknown significance--a variation in one of the genes was identified, but it is unclear how this impacts cancer risk in the family    Advantages and Disadvantages   There are advantages and  disadvantages to genetic testing.    Advantages    May clarify your cancer risk    Can help you make medical decisions    May explain the cancers in your family    May give useful information to your family members (if you share your results)    Disadvantages    Possible negative emotional impact of learning about inherited cancer risk    Uncertainty in interpreting a negative test result in some situations    Possible genetic discrimination concerns (see below)    Genetic Information Nondiscrimination Act (JADE)  JADE is a federal law that protects individuals from health insurance or employment discrimination based on a genetic test result alone.  Although rare, there are currently no legal discrimination protections in terms of life insurance, long term care, or disability insurances.  Visit the [employer] [place] website to learn more.    Reducing Cancer Risk  All of the genes described above have nationally recognized cancer screening guidelines that would be recommended for individuals who test positive.  In addition to increased cancer screening, surgeries may be offered or recommended to reduce cancer risk.  Recommendations are based upon an individual s genetic test result as well as their personal and family history of cancer.    Questions to Think About Regarding Genetic Testing:    What effect will the test result have on me and my relationship with my family members if I have an inherited gene mutation?  If I don t have a gene mutation?    Should I share my test results, and how will my family react to this news, which may also affect them?    Are my children ready to learn new information that may one day affect their own health?    Hereditary Cancer Resources    FORCE: Facing Our Risk of Cancer Empowered facingourrisk.org   Bright Pink bebrightpink.org   Li-Fraumeni Syndrome Association lfsassociation.org   PTEN World PTENworld.com   No stomach for cancer, Inc.  nostomachforcancer.org   Stomach cancer relief network Scrnet.org   Collaborative Group of the Americas on Inherited Colorectal Cancer (CGA) cgaicc.com    Cancer Care cancercare.org   American Cancer Society (ACS) cancer.org   National Cancer [place] (NCI) cancer.gov     Please call us if you have any questions or concerns.   Cancer Risk Management Program [phone] ([phone])  ? Gio Barahona, MS, [facility] [phone]  ? Barbara Butler, MS, [facility]  [phone]  ? Olivia Ross, MS, [facility]  [phone]  ? Isela Roe, MS, [facility] [phone]  ? Brittney Mickie, MS, [facility] [phone]  ? Cami Santa, MS, [facility]  [phone]    References  1. Dhruv A, Chuck PDP, Michele S, Chris THORNE, Gilmer JE, Alberto JL, Erika N, Doris H, Yudelka O, Juan A, Tomas B, Susu P, Manmylene S, Marques DM, Liao N, Karie E, Jah H, Aubrey E, Jimmy J, Gronkale J, Brayan B, Jamel H, Thorlacius S, Eerola H, Elba H, Wilver K, Caitlin OP. Average risks of breast and ovarian cancer associated with BRCA1 or BRCA2 mutations detected in case series unselected for family history: a combined analysis of 222 studies. Am J Hum Gloria. 2003;72:1117-30.  2. Kavita PANDYA, Anita M, Jaret G.  BRCA1 and BRCA2 Hereditary Breast and Ovarian Cancer. Gene Reviews online. 2013.  3. Delfin YC, Monica S, Jennifer G, Castillo S. Breast cancer risk among male BRCA1 and BRCA2 mutation carriers. J Natl Cancer Inst. 2007;99:1811-4.  4. Randy ROSS, Jhonny I, Jacob J, Hamilton E, Tashi ER, Devang F. Risk of breast cancer in male BRCA2 carriers. J Med Gloria. 2010;47:710-1.  5. National Comprehensive Cancer Network. Clinical practice guidelines in oncology, colorectal cancer screening. Available online (registration required). 2015.  6. Rfaael PRADHAN, Rich J, Alejandra J, Umer LA, Stanton RICK, Naty C. Lifetime cancer risks in individuals with germline PTEN mutations. Clin Cancer Res. 2012;18:400-7.  7. Ines GUARDADO. Cowden Syndrome: A Critical Review of the  Clinical Literature. J Gloria . 2009:18:13-27.  8. Harish A, Angel D, Elise S, Susy P, Emily T, Annel M, Dejan B, Gen H, Dilcia R, Mariola K, Kris L, Randy DG, Marques D, Bin DF, Kimberly MR, The Breast Cancer Susceptibility Collaboration () & Ally PANDYA. KIMBERLY mutations that cause ataxia-telangiectasia are breast cancer susceptibility alleles. Nature Genetics. 2006;38:873-875  9. Zaid N , Christine Y, Lanie J, Yasmany L, Yamile GM , Melvin ML, Gallinger S, Morelos AG, Syngal S, Rossy ML, Dave J , Alicia R, Marcia SZ, Cahrli JR, Sawyer VE, Marin M, Vostevenson B, Natacha N, Ghassan RH, Victorina KW, and Suzanne AP. KIMBERLY mutations in patients with hereditary pancreatic cancer. Cancer Discover. 2012;2:41-46  10. Dhruv HAGEN, et al. Breast-Cancer Risk in Families with Mutations in PALB2. NEJM. 2014; 371(6):497-506.  11. CHEK2 Breast Cancer Case-Control Consortium. CHEK2*1100delC and susceptibility to breast cancer: A collaborative analysis involving 10,860 breast cancer cases and 9,065 controls from 10 studies. Am J Hum Lgoria, 74 (2004), pp. 6887-5622  12. Rosana T, Enid S, Mae K, et al. Spectrum of Mutations in BRCA1, BRCA2, CHEK2, and TP53 in Families at High Risk of Breast Cancer. MAX. 2006;295(12):1530-5753.   13. Yarelis C, Rodrigo D, Mandi A, et al. Risk of breast cancer in women with a CHEK2 mutation with and without a family history of breast cancer. J Clin Oncol. 2011;29:8968-3306.  14. Mahendra H, Jacob E, Fabi SJ, et al. Contribution of germline mutations in the RAD51B, RAD51C, and RAD51D genes to ovarian cancer in the population. J Clin Oncol. 2015;33(26):8685-6802. Doi:10.1200/JCO.2015.61.2408.  15. Peter T, Ray ANDUJAR, Ebony P, et al. Mutations in BRIP1 confer high risk of ovarian cancer. Dianne Gloria. 2011;43(11):6128-1594. doi:10.1038/ng.955.

## 2021-06-22 NOTE — PROGRESS NOTES
6/21/2021    Referring Provider: Maia Watson MD    Presenting Information:   Given concerns regarding the potential for COVID-19 exposure during a clinic visit, Cherrie elected for a video genetic counseling visit through the Cancer Risk Management Program to discuss her family history of breast, stomach, and pancreatic cancer. Today we reviewed this history, cancer screening recommendations, and available genetic testing options.    Personal History:  Cherrie is a 45 year old year old female. She does not have any personal history of cancer. She had her first menstrual period at age 15, she does not have children, and is premenopausal. Cherrie has her ovaries, fallopian tubes and uterus in place, and she has had no ovarian cancer screening to date. She reports that she has not used hormone replacement therapy. She has annual clinical breast exams and mammograms; her most recent mammogram in December 2020 was normal. Cherrie has not had a colonoscopy. She does not regularly do any other cancer screening at this time.     Family History: (Please see scanned pedigree for detailed family history information). Please see updated family history in bold.  ? Mother had breast cancer at 48. She had a double-mastectomy. Cherrie reports that after the surgery, it was found that she had bilateral breast cancer. She had chemotherapy and was treated with tamoxifen. She has not had genetic testing.   ? Maternal grandmother had stomach cancer in her 70s. This was believed to be a result of a stomach surgery that she had earlier in life, though the details of this are unclear.   ? Maternal grandfather had lung cancer; he was a lifelong smoker.   ? A paternal first cousin of Cherrie's mother also had breast cancer. She was diagnosed in her 50s.   ? Cherrie's paternal grandmother had two sisters diagnosed with breast cancer in their 60s. One of which was recently diagnosed with pancreatic cancer in her late 80s.    Her maternal ethnicity is  "[place]ovenian. Her paternal ethnicity is [place].  There is no known Ashkenazi Muslim ancestry on either side of her family. She does report that ancestry genetic testing showed some \"Burkinan Muslim\" ancestry on her father's side, but that this percentage was very small. There is no reported consanguinity.    Discussion:    We discussed the information from her previous appointment in 2018. At that time, it was recommended that Cherrie talk to her mother about pursuing genetic testing as she was the person affected with cancer. Cherrie stated that she never talked to her mother about getting genetic testing after her last appointment.    We discussed that if her mother had positive genetic testing, Cherrie would be at a 50% chance of inheriting the mutation her mother has. If her mother tested negative, she would not be at risk to inherited mutations in these genes from her mother as her mother would not have any mutations in these genes to pass onto her.    We discussed that the family history of cancer on dad's side of the family. We discussed that the relatives on that side were more distantly related and there are multiple unaffected relatives as well. This means that we are less likely to identify a hereditary cause for these cancers in Cherrie. However, if Cherrie were to pursue genetic testing, we could also test for the genes associated with the cancers on her paternal side of the family.    We discussed that given the family history on both sides of the family, Cherrie could consider genetic testing herself if she was interested. Cherrie elected to talk to her mother about getting genetic testing first. We discussed that Cherrie should contact me with her mother's results, once they are complete, so that I am able to provide a better risk assessment for her.    Screening Recommendations:     Based on her personal and family history, Cherrie has a 35.4% lifetime risk of developing breast cancer based on the VIJI model. As such, Cherrie " meets current National Comprehensive Cancer Network (NCCN) guidelines for high risk breast screening. This includes annual breast MRI in addition to annual mammogram beginning at age 38. In addition, Cherrie should be receiving clinical breast exams by her physician. We discussed that Cherrie could participate in our Cancer Risk Management Program in which our Physician Assistant provides an individual screening plan and assists with medical management. Cherrie was not interested in this screening at this time. I encouraged Cherrie to contact me should she wish to be seen by our Physician Assistant for this screening in the future.     Other population cancer screening options, such as those recommended by the American Cancer Society and the National Comprehensive Cancer Network (NCCN), are also appropriate for Cherrie and her family. These screening recommendations may change if there are changes to Cherrie's personal and/or family history of cancer. Final screening recommendations should be made by each individual's managing physician.    Plan:  1) Today Cherrie elected to defer genetic testing for now and she will discuss genetic testing with her mother.  2) Cherrie will contact me if her mother pursues genetic testing or Cherrie becomes interested in genetic testing herself. If her mother pursues genetic testing, Cherrie should contact me to determine next steps and if there are any different screening recommendations for her. My contact information was provided.    Time spent on video: 14 minutes    Gio Barahona MS, JD [facility]  Licensed, Certified Genetic Counselor  (P): [phone]  (F): [phone]

## 2021-09-25 ENCOUNTER — HEALTH MAINTENANCE LETTER (OUTPATIENT)
Age: 46
End: 2021-09-25

## 2022-01-05 ENCOUNTER — ANCILLARY PROCEDURE (OUTPATIENT)
Dept: MAMMOGRAPHY | Facility: CLINIC | Age: 47
End: 2022-01-05
Attending: FAMILY MEDICINE
Payer: COMMERCIAL

## 2022-01-05 DIAGNOSIS — Z12.31 VISIT FOR SCREENING MAMMOGRAM: ICD-10-CM

## 2022-01-05 PROCEDURE — 77067 SCR MAMMO BI INCL CAD: CPT | Performed by: RADIOLOGY

## 2022-01-05 PROCEDURE — 77063 BREAST TOMOSYNTHESIS BI: CPT | Performed by: RADIOLOGY

## 2022-07-02 ENCOUNTER — HEALTH MAINTENANCE LETTER (OUTPATIENT)
Age: 47
End: 2022-07-02

## 2022-07-25 ENCOUNTER — OFFICE VISIT (OUTPATIENT)
Dept: FAMILY MEDICINE | Facility: CLINIC | Age: 47
End: 2022-07-25
Payer: COMMERCIAL

## 2022-07-25 VITALS
HEIGHT: 63 IN | SYSTOLIC BLOOD PRESSURE: 92 MMHG | DIASTOLIC BLOOD PRESSURE: 66 MMHG | HEART RATE: 88 BPM | WEIGHT: 126.19 LBS | TEMPERATURE: 98.3 F | BODY MASS INDEX: 22.36 KG/M2

## 2022-07-25 DIAGNOSIS — Z80.3 FAMILY HISTORY OF BREAST CANCER IN FIRST DEGREE RELATIVE: ICD-10-CM

## 2022-07-25 DIAGNOSIS — L81.2 FRECKLES: ICD-10-CM

## 2022-07-25 DIAGNOSIS — Z00.00 ROUTINE GENERAL MEDICAL EXAMINATION AT A HEALTH CARE FACILITY: Primary | ICD-10-CM

## 2022-07-25 PROCEDURE — 99396 PREV VISIT EST AGE 40-64: CPT | Mod: 25 | Performed by: FAMILY MEDICINE

## 2022-07-25 PROCEDURE — 90471 IMMUNIZATION ADMIN: CPT | Performed by: FAMILY MEDICINE

## 2022-07-25 PROCEDURE — 90715 TDAP VACCINE 7 YRS/> IM: CPT | Performed by: FAMILY MEDICINE

## 2022-07-25 ASSESSMENT — ENCOUNTER SYMPTOMS
MYALGIAS: 0
FEVER: 0
DYSURIA: 0
HEMATOCHEZIA: 0
PARESTHESIAS: 0
DIARRHEA: 0
SORE THROAT: 0
COUGH: 0
PALPITATIONS: 0
HEMATURIA: 0
WEAKNESS: 0
EYE PAIN: 0
NERVOUS/ANXIOUS: 0
ARTHRALGIAS: 0
HEADACHES: 0
DIZZINESS: 0
JOINT SWELLING: 0
BREAST MASS: 0
ABDOMINAL PAIN: 0
FREQUENCY: 0
SHORTNESS OF BREATH: 0
NAUSEA: 0
HEARTBURN: 0
CHILLS: 0
CONSTIPATION: 0

## 2022-07-25 NOTE — PROGRESS NOTES
SUBJECTIVE:   CC: Cherrie Austin is an 46 year old woman who presents for preventive health visit.       Patient has been advised of split billing requirements and indicates understanding: Yes  Healthy Habits:     Getting at least 3 servings of Calcium per day:  Yes    Bi-annual eye exam:  Yes    Dental care twice a year:  Yes    Sleep apnea or symptoms of sleep apnea:  None    Diet:  Gluten-free/reduced    Frequency of exercise:  6-7 days/week    Duration of exercise:  45-60 minutes    Taking medications regularly:  Yes    Barriers to taking medications:  Not applicable    Medication side effects:  Not applicable    PHQ-2 Total Score: 0    Additional concerns today:  No          (Z00.00) Routine general medical examination at a health care facility  (primary encounter diagnosis)  Comment:   Plan:     (Z80.3) Family history of breast cancer in first degree relative  Comment:   Plan: no BRCA gene identified    (L81.2) Yves  Comment:   Plan: Adult Dermatology Referral               Today's PHQ-2 Score:   PHQ-2 ( 1999 [employer]) 5/13/2021   Q1: Little interest or pleasure in doing things 0   Q2: Feeling down, depressed or hopeless 0   PHQ-2 Score 0   PHQ-2 Total Score (12-17 Years)- Positive if 3 or more points; Administer PHQ-A if positive 0   Q1: Little interest or pleasure in doing things Not at all   Q2: Feeling down, depressed or hopeless Not at all   PHQ-2 Score 0       Abuse: Current or Past (Physical, Sexual or Emotional) - No  Do you feel safe in your environment? Yes    Have you ever done Advance Care Planning? (For example, a Health Directive, POLST, or a discussion with a medical provider or your loved ones about your wishes):     Social History     Tobacco Use     Smoking status: Never Smoker     Smokeless tobacco: Never Used   Substance Use Topics     Alcohol use: Yes     Alcohol/week: 0.8 standard drinks     Comment: 1-2 drinks a month         Alcohol Use 5/13/2021   Prescreen: >3 drinks/day or >7  drinks/week? No   Prescreen: >3 drinks/day or >7 drinks/week? -       Reviewed orders with patient.  Reviewed health maintenance and updated orders accordingly - Yes  Lab work is in process  Labs reviewed in EPIC  BP Readings from Last 3 Encounters:   07/25/22 92/66   05/13/21 96/62   05/02/18 102/62    Wt Readings from Last 3 Encounters:   07/25/22 57.2 kg (126 lb 3 oz)   05/13/21 55.8 kg (123 lb)   05/02/18 53.6 kg (118 lb 3.2 oz)                    Breast Cancer Screening:    FHS-7:   Breast CA Risk Assessment (FHS-7) 1/5/2022   Did any of your first-degree relatives have breast or ovarian cancer? Yes   Did any of your relatives have bilateral breast cancer? Unknown   Did any man in your family have breast cancer? No   Did any woman in your family have breast and ovarian cancer? No   Did any woman in your family have breast cancer before age 50 y? Yes   Do you have 2 or more relatives with breast and/or ovarian cancer? No   Do you have 2 or more relatives with breast and/or bowel cancer? No       Mammogram Screening - Offered annual screening and updated Health Maintenance for mutual plan based on risk factor consideration    Pertinent mammograms are reviewed under the imaging tab.    History of abnormal Pap smear: NO - age 30-65 PAP every 5 years with negative HPV co-testing recommended  PAP / HPV Latest Ref Rng & Units 5/13/2021 5/2/2018 3/25/2014   PAP (Historical) - NIL NIL NIL   HPV16 NEG:Negative Negative Negative -   HPV18 NEG:Negative Negative Negative -   HRHPV NEG:Negative Negative Negative -     Reviewed and updated as needed this visit by clinical staff                    Reviewed and updated as needed this visit by Provider                   Past Medical History:   Diagnosis Date     NO ACTIVE PROBLEMS       Past Surgical History:   Procedure Laterality Date     C SCOLIOSIS SCREENING      surgery age 15       Review of Systems  CONSTITUTIONAL: NEGATIVE for fever, chills, change in  weight  INTEGUMENTARU/SKIN: NEGATIVE for worrisome rashes, moles or lesions  EYES: NEGATIVE for vision changes or irritation  ENT: NEGATIVE for ear, mouth and throat problems  RESP: NEGATIVE for significant cough or SOB  BREAST: NEGATIVE for masses, tenderness or discharge  CV: NEGATIVE for chest pain, palpitations or peripheral edema  GI: NEGATIVE for nausea, abdominal pain, heartburn, or change in bowel habits  : NEGATIVE for unusual urinary or vaginal symptoms. Periods are regular.  MUSCULOSKELETAL: NEGATIVE for significant arthralgias or myalgia  NEURO: NEGATIVE for weakness, dizziness or paresthesias  PSYCHIATRIC: NEGATIVE for changes in mood or affect     OBJECTIVE:   There were no vitals taken for this visit.  Physical Exam  GENERAL: healthy, alert and no distress  EYES: Eyes grossly normal to inspection, PERRL and conjunctivae and sclerae normal  HENT: ear canals and TM's normal, nose and mouth without ulcers or lesions  NECK: no adenopathy, no asymmetry, masses, or scars and thyroid normal to palpation  RESP: lungs clear to auscultation - no rales, rhonchi or wheezes  BREAST: normal without masses, tenderness or nipple discharge and no palpable axillary masses or adenopathy  CV: regular rate and rhythm, normal S1 S2, no S3 or S4, no murmur, click or rub, no peripheral edema and peripheral pulses strong  ABDOMEN: soft, nontender, no hepatosplenomegaly, no masses and bowel sounds normal  MS: no gross musculoskeletal defects noted, no edema  SKIN: no suspicious lesions or rashes  NEURO: Normal strength and tone, mentation intact and speech normal  PSYCH: mentation appears normal, affect normal/bright    Diagnostic Test Results:  Labs reviewed in Epic    ASSESSMENT/PLAN:   (Z00.00) Routine general medical examination at a health care facility  (primary encounter diagnosis)  Comment:    Plan:      (Z80.3) Family history of breast cancer in first degree relative  Comment:    Plan:      (L81.2)  "Yves  Comment:    Plan: Adult Dermatology Referral               Patient has been advised of split billing requirements and indicates understanding: Yes    COUNSELING:  Reviewed preventive health counseling, as reflected in patient instructions       Regular exercise       Healthy diet/nutrition    Estimated body mass index is 21.79 kg/m  as calculated from the following:    Height as of 5/13/21: 1.6 m (5' 3\").    Weight as of 5/13/21: 55.8 kg (123 lb).        She reports that she has never smoked. She has never used smokeless tobacco.      Counseling Resources:  ATP IV Guidelines  Pooled Cohorts Equation Calculator  Breast Cancer Risk Calculator  BRCA-Related Cancer Risk Assessment: FHS-7 Tool  FRAX Risk Assessment  ICSI Preventive Guidelines  Dietary Guidelines for Americans, 2010  USDA's MyPlate  ASA Prophylaxis  Lung CA Screening    Maia Watson MD  [facility]  "

## 2023-01-09 ENCOUNTER — ANCILLARY PROCEDURE (OUTPATIENT)
Dept: MAMMOGRAPHY | Facility: CLINIC | Age: 48
End: 2023-01-09
Payer: COMMERCIAL

## 2023-01-09 DIAGNOSIS — Z12.31 VISIT FOR SCREENING MAMMOGRAM: ICD-10-CM

## 2023-01-09 PROCEDURE — 77067 SCR MAMMO BI INCL CAD: CPT

## 2023-01-09 PROCEDURE — 77063 BREAST TOMOSYNTHESIS BI: CPT

## 2023-01-18 ENCOUNTER — OFFICE VISIT (OUTPATIENT)
Dept: DERMATOLOGY | Facility: CLINIC | Age: 48
End: 2023-01-18
Payer: COMMERCIAL

## 2023-01-18 DIAGNOSIS — D18.01 CHERRY ANGIOMA: Primary | ICD-10-CM

## 2023-01-18 DIAGNOSIS — L81.2 FRECKLES: ICD-10-CM

## 2023-01-18 DIAGNOSIS — L81.4 SOLAR LENTIGO: ICD-10-CM

## 2023-01-18 PROCEDURE — 99203 OFFICE O/P NEW LOW 30 MIN: CPT | Performed by: PHYSICIAN ASSISTANT

## 2023-01-18 ASSESSMENT — PAIN SCALES - GENERAL: PAINLEVEL: NO PAIN (0)

## 2023-01-18 NOTE — LETTER
1/18/2023         RE: Cherrie Austin  [address]  [place] [address]        Dear Colleague,    Thank you for referring your patient, Cherrie Austin, to the [facility]. Please see a copy of my visit note below.    [facility] Dermatology Note  Encounter Date: Jan 18, 2023  Office Visit     Dermatology Problem List:  1. Mole removed on an upper thigh, per patient  -Records requested by patient prior to visit, have not received  2. AKs,s/p cryo  ____________________________________________    Assessment & Plan:    # Scar - s/p fall this past summer   - L knee  -PIH present- edu this will take several months to resolve  - continue silicone bandages, keep out of the sun    # Multiple clinically benign nevi on the trunk and extremities. No treatment is necessary at this time unless the lesion changes or becomes symptomatic.    - ABCDEs of melanoma were discussed and self skin checks were advised.    # Cherry Angiomas - trunk and extremities. Explained to patient benign nature of lesion. No treatment is necessary at this time unless the lesion changes or becomes symptomatic.      # Solar lentigines on the sun exposed skin areas. Benign nature was discussed. No further intervention required at this time.    - Sun precaution was advised including the use of sun screens of SPF 30 or higher, sun protective clothing, and avoidance of tanning beds.      Procedures Performed:   none    Follow-up: 1 year(s) in-person, or earlier for new or changing lesions    Staff and Scribe:     Scribe Disclosure:   I, Isaak Cruz, am serving as a scribe to document services personally performed by this physician, Karyn Marsh PA-C, based on data collection and the provider's statements to me.   Provider Disclosure:   The documentation recorded by the scribe accurately reflects the services I personally performed and the decisions made by me.    All risks, benefits and  alternatives were discussed with patient.  Patient is in agreement and understands the assessment and plan.  All questions were answered.    Karyn Marsh PA-C, MPAS  [facility]: Phone: [phone], Fax: [phone]  [facility]: Phone: [phone],  Fax: [phone]  [facility]: Phone: [phone], Fax: [phone]  ____________________________________________    CC: No chief complaint on file.    HPI:  Ms. Cherrie Austin is a(n) 47 year old female who presents today as a new patient for a FBSE.    Referred to derm for freckles. Last seen by derm in 2018 - Dr. Montero.    Today, notes scar on the knee, has used silicone patches. No personal fhx skin cancer.     Patient is otherwise feeling well, without additional skin concerns.    Labs Reviewed:  N/A    Physical Exam:  Vitals: There were no vitals taken for this visit.  SKIN: Full skin, which includes the head/face, both arms, chest, back, abdomen,both legs, genitalia and/or groin buttocks, digits and/or nails, was examined.  - Brunson type II.  -numerous lentigines and nevi on the back  - There are dome shaped bright red papules on the trunk and extremities.   - Multiple regular brown pigmented macules and papules are identified on the trunk and extremities, >100.  - Scattered brown macules on sun exposed areas.  -Scar on the left knee from a previous fall.  - Had spot removed from posterior right inner knee. Scar present.    - No other lesions of concern on areas examined.    Medications:  No current outpatient medications on file.     No current facility-administered medications for this visit.      Past Medical History:   Patient Active Problem List   Diagnosis     Other kyphoscoliosis and scoliosis     Family history of breast cancer in first degree relative     CARDIOVASCULAR SCREENING; LDL GOAL LESS THAN 160     Right hip pain      Cystic breast     IUD (intrauterine device) in place     Past Medical History:   Diagnosis Date     NO ACTIVE PROBLEMS             Again, thank you for allowing me to participate in the care of your patient.        Sincerely,        Karyn Marsh PA-C

## 2023-01-18 NOTE — PROGRESS NOTES
[facility] Dermatology Note  Encounter Date: Jan 18, 2023  Office Visit     Dermatology Problem List:  1. Mole removed on an upper thigh, per patient  -Records requested by patient prior to visit, have not received  2. AKs,s/p cryo  ____________________________________________    Assessment & Plan:    # Scar - s/p fall this past summer   - L knee  -PIH present- edu this will take several months to resolve  - continue silicone bandages, keep out of the sun    # Multiple clinically benign nevi on the trunk and extremities. No treatment is necessary at this time unless the lesion changes or becomes symptomatic.    - ABCDEs of melanoma were discussed and self skin checks were advised.    # Cherry Angiomas - trunk and extremities. Explained to patient benign nature of lesion. No treatment is necessary at this time unless the lesion changes or becomes symptomatic.      # Solar lentigines on the sun exposed skin areas. Benign nature was discussed. No further intervention required at this time.    - Sun precaution was advised including the use of sun screens of SPF 30 or higher, sun protective clothing, and avoidance of tanning beds.      Procedures Performed:   none    Follow-up: 1 year(s) in-person, or earlier for new or changing lesions    Staff and Scribe:     Scribe Disclosure:   I, Isaak Cruz, am serving as a scribe to document services personally performed by this physician, Karyn Marsh PA-C, based on data collection and the provider's statements to me.   Provider Disclosure:   The documentation recorded by the scribe accurately reflects the services I personally performed and the decisions made by me.    All risks, benefits and alternatives were discussed with patient.  Patient is in agreement and understands the assessment and plan.  All questions were answered.    Karyn Marsh PA-C, [facility]  [facility]: Phone:  [phone], Fax: [phone]  [facility]: Phone: [phone],  Fax: [phone]  [facility]: Phone: [phone], Fax: [phone]  ____________________________________________    CC: No chief complaint on file.    HPI:  Ms. Cherrie Austin is a(n) 47 year old female who presents today as a new patient for a FBSE.    Referred to derm for freckles. Last seen by derm in 2018 - Dr. Montero.    Today, notes scar on the knee, has used silicone patches. No personal fhx skin cancer.     Patient is otherwise feeling well, without additional skin concerns.    Labs Reviewed:  N/A    Physical Exam:  Vitals: There were no vitals taken for this visit.  SKIN: Full skin, which includes the head/face, both arms, chest, back, abdomen,both legs, genitalia and/or groin buttocks, digits and/or nails, was examined.  - Brunson type II.  -numerous lentigines and nevi on the back  - There are dome shaped bright red papules on the trunk and extremities.   - Multiple regular brown pigmented macules and papules are identified on the trunk and extremities, >100.  - Scattered brown macules on sun exposed areas.  -Scar on the left knee from a previous fall.  - Had spot removed from posterior right inner knee. Scar present.    - No other lesions of concern on areas examined.    Medications:  No current outpatient medications on file.     No current facility-administered medications for this visit.      Past Medical History:   Patient Active Problem List   Diagnosis     Other kyphoscoliosis and scoliosis     Family history of breast cancer in first degree relative     CARDIOVASCULAR SCREENING; LDL GOAL LESS THAN 160     Right hip pain     Cystic breast     IUD (intrauterine device) in place     Past Medical History:   Diagnosis Date     NO ACTIVE PROBLEMS

## 2023-01-18 NOTE — NURSING NOTE
Cherrie Austin's chief complaint for this visit includes:  Chief Complaint   Patient presents with     Skin Check     No areas of concern. No hx of skin cancer     PCP: Maia Watson    Referring Provider:  Referred Self, MD  No address on file    There were no vitals taken for this visit.  No Pain (0)        Allergies   Allergen Reactions     Pcn [Penicillins]      rash         Do you need any medication refills at today's visit?    No

## 2023-06-26 ENCOUNTER — PATIENT OUTREACH (OUTPATIENT)
Dept: CARE COORDINATION | Facility: CLINIC | Age: 48
End: 2023-06-26
Payer: COMMERCIAL

## 2023-07-10 ENCOUNTER — PATIENT OUTREACH (OUTPATIENT)
Dept: CARE COORDINATION | Facility: CLINIC | Age: 48
End: 2023-07-10
Payer: COMMERCIAL

## 2023-09-11 ASSESSMENT — ENCOUNTER SYMPTOMS
HEMATOCHEZIA: 0
HEADACHES: 0
HEARTBURN: 0
BREAST MASS: 0
EYE PAIN: 0
SHORTNESS OF BREATH: 0
CONSTIPATION: 0
SORE THROAT: 0
COUGH: 0
ABDOMINAL PAIN: 0
FEVER: 0
NERVOUS/ANXIOUS: 0
CHILLS: 0
PARESTHESIAS: 0
MYALGIAS: 0
PALPITATIONS: 0
NAUSEA: 0
DIARRHEA: 0
DIZZINESS: 0
WEAKNESS: 0
HEMATURIA: 0
JOINT SWELLING: 0
ARTHRALGIAS: 0
FREQUENCY: 0
DYSURIA: 0

## 2023-09-18 ENCOUNTER — OFFICE VISIT (OUTPATIENT)
Dept: FAMILY MEDICINE | Facility: CLINIC | Age: 48
End: 2023-09-18
Payer: COMMERCIAL

## 2023-09-18 VITALS
HEART RATE: 54 BPM | DIASTOLIC BLOOD PRESSURE: 59 MMHG | TEMPERATURE: 97.6 F | RESPIRATION RATE: 16 BRPM | OXYGEN SATURATION: 100 % | WEIGHT: 127.8 LBS | HEIGHT: 63 IN | BODY MASS INDEX: 22.64 KG/M2 | SYSTOLIC BLOOD PRESSURE: 95 MMHG

## 2023-09-18 DIAGNOSIS — Z12.11 SCREEN FOR COLON CANCER: ICD-10-CM

## 2023-09-18 DIAGNOSIS — Z00.00 ROUTINE GENERAL MEDICAL EXAMINATION AT A HEALTH CARE FACILITY: Primary | ICD-10-CM

## 2023-09-18 PROCEDURE — 99396 PREV VISIT EST AGE 40-64: CPT | Performed by: FAMILY MEDICINE

## 2023-09-18 RX ORDER — COPPER 313.4 MG/1
1 INTRAUTERINE DEVICE INTRAUTERINE ONCE
COMMUNITY

## 2023-09-18 ASSESSMENT — PAIN SCALES - GENERAL: PAINLEVEL: NO PAIN (0)

## 2023-09-18 ASSESSMENT — ENCOUNTER SYMPTOMS
CONSTIPATION: 0
CHILLS: 0
HEMATOCHEZIA: 0
DIZZINESS: 0
FEVER: 0
DIARRHEA: 0
PARESTHESIAS: 0
DYSURIA: 0
WEAKNESS: 0
EYE PAIN: 0
HEADACHES: 0
SORE THROAT: 0
ABDOMINAL PAIN: 0
BREAST MASS: 0
NAUSEA: 0
HEARTBURN: 0
SHORTNESS OF BREATH: 0
HEMATURIA: 0
JOINT SWELLING: 0
MYALGIAS: 0
ARTHRALGIAS: 0
NERVOUS/ANXIOUS: 0
FREQUENCY: 0
PALPITATIONS: 0
COUGH: 0

## 2023-09-18 NOTE — PROGRESS NOTES
SUBJECTIVE:   CC: Cherrie is an 47 year old who presents for preventive health visit.       Healthy Habits:     Getting at least 3 servings of Calcium per day:  Yes    Bi-annual eye exam:  Yes    Dental care twice a year:  Yes    Sleep apnea or symptoms of sleep apnea:  None    Diet:  Gluten-free/reduced    Frequency of exercise:  6-7 days/week    Duration of exercise:  Greater than 60 minutes    Taking medications regularly:  Yes    Medication side effects:  None    Additional concerns today:  No            (Z00.00) Routine general medical examination at a health care facility  (primary encounter diagnosis)  Comment: UTD on routine screens except due for colon screens  Getting mammogram yearly  Due for lipids at some point - low risk  Plan: Lipid panel reflex to direct LDL Fasting,         Comprehensive metabolic panel (BMP + Alb, Alk         Phos, ALT, AST, Total. Bili, TP)        Vaccines reviewed - she will get from work    (Z12.11) Screen for colon cancer  Comment:   Plan: Colonoscopy Screening  Referral                 Have you ever done Advance Care Planning? (For example, a Health Directive, POLST, or a discussion with a medical provider or your loved ones about your wishes): No, advance care planning information given to patient to review.  Patient declined advance care planning discussion at this time.    Social History     Tobacco Use    Smoking status: Never    Smokeless tobacco: Never   Substance Use Topics    Alcohol use: Yes     Alcohol/week: 0.8 standard drinks of alcohol     Comment: 1-2 drinks a month             9/11/2023    10:29 AM   Alcohol Use   Prescreen: >3 drinks/day or >7 drinks/week? No       Reviewed orders with patient.  Reviewed health maintenance and updated orders accordingly -       Breast Cancer Screening:    FHS-7:       1/5/2022     7:26 AM 1/9/2023     1:44 PM   Breast CA Risk Assessment (FHS-7)   Did any of your first-degree relatives have breast or ovarian cancer?  Yes Yes   Did any of your relatives have bilateral breast cancer? Unknown No   Did any man in your family have breast cancer? No No   Did any woman in your family have breast and ovarian cancer? No No   Did any woman in your family have breast cancer before age 50 y? Yes Yes   Do you have 2 or more relatives with breast and/or ovarian cancer? No No   Do you have 2 or more relatives with breast and/or bowel cancer? No No     Mother had no genetic disease found    Pertinent mammograms are reviewed under the imaging tab.    History of abnormal Pap smear: NO - age 30-65 PAP every 5 years with negative HPV co-testing recommended      Latest Ref Rng & Units 5/13/2021     1:30 PM 5/13/2021     1:28 PM 5/2/2018     3:58 PM   PAP / HPV   PAP (Historical)   NIL     HPV 16 DNA NEG^Negative Negative   Negative    HPV 18 DNA NEG^Negative Negative   Negative    Other HR HPV NEG^Negative Negative   Negative      Reviewed and updated as needed this visit by clinical staff                  Reviewed and updated as needed this visit by Provider                 Past Medical History:   Diagnosis Date    NO ACTIVE PROBLEMS       Past Surgical History:   Procedure Laterality Date    BACK SURGERY  7/1990    Scoliosis Spinal Fusion    C SCOLIOSIS SCREENING      surgery age 15       Review of Systems   Constitutional:  Negative for chills and fever.   HENT:  Negative for congestion, ear pain, hearing loss and sore throat.    Eyes:  Negative for pain and visual disturbance.   Respiratory:  Negative for cough and shortness of breath.    Cardiovascular:  Negative for chest pain, palpitations and peripheral edema.   Gastrointestinal:  Negative for abdominal pain, constipation, diarrhea, heartburn, hematochezia and nausea.   Breasts:  Negative for tenderness, breast mass and discharge.   Genitourinary:  Negative for dysuria, frequency, genital sores, hematuria, pelvic pain, urgency, vaginal bleeding and vaginal discharge.   Musculoskeletal:   "Negative for arthralgias, joint swelling and myalgias.   Skin:  Negative for rash.   Neurological:  Negative for dizziness, weakness, headaches and paresthesias.   Psychiatric/Behavioral:  Negative for mood changes. The patient is not nervous/anxious.           OBJECTIVE:   BP 95/59   Pulse 54   Temp 97.6  F (36.4  C) (Temporal)   Resp 16   Ht 1.607 m (5' 3.25\")   Wt 58 kg (127 lb 12.8 oz)   LMP 09/04/2023 (Approximate)   SpO2 100%   Breastfeeding No   BMI 22.46 kg/m    Physical Exam  GENERAL: healthy, alert and no distress  EYES: Eyes grossly normal to inspection, PERRL and conjunctivae and sclerae normal  HENT: ear canals and TM's normal, nose and mouth without ulcers or lesions  NECK: no adenopathy, no asymmetry, masses, or scars and thyroid normal to palpation  RESP: lungs clear to auscultation - no rales, rhonchi or wheezes  BREAST: normal without masses, tenderness or nipple discharge and no palpable axillary masses or adenopathy  CV: regular rate and rhythm, normal S1 S2, no S3 or S4, no murmur, click or rub, no peripheral edema and peripheral pulses strong  ABDOMEN: soft, nontender, no hepatosplenomegaly, no masses and bowel sounds normal  MS: no gross musculoskeletal defects noted, no edema  SKIN: no suspicious lesions or rashes  NEURO: Normal strength and tone, mentation intact and speech normal  PSYCH: mentation appears normal, affect normal/bright    Diagnostic Test Results:  Labs reviewed in Epic    ASSESSMENT/PLAN:   (Z00.00) Routine general medical examination at a health care facility  (primary encounter diagnosis)  Comment:   Plan: Lipid panel reflex to direct LDL Fasting,         Comprehensive metabolic panel (BMP + Alb, Alk         Phos, ALT, AST, Total. Bili, TP)            (Z12.11) Screen for colon cancer  Comment:   Plan: Colonoscopy Screening  Referral                  COUNSELING:  Reviewed preventive health counseling, as reflected in patient instructions        She reports " that she has never smoked. She has never used smokeless tobacco.          Maia Watson MD  [facility]

## 2023-10-12 ENCOUNTER — TRANSFERRED RECORDS (OUTPATIENT)
Dept: HEALTH INFORMATION MANAGEMENT | Facility: CLINIC | Age: 48
End: 2023-10-12
Payer: COMMERCIAL

## 2023-10-12 NOTE — LETTER
October 26, 2023      Cherrie Austin  [address]  [place] [address]        Dear ,    We are writing to inform you of your test results.  There was a polyp noted on the colonoscopy.  Given this it is usually advised to repeat colonoscopy in 5 years.  Depending on what the pathology shows this might change.  Still waiting for that report to return .        If you have any questions or concerns, please call the clinic at the number listed above.       Sincerely,      Dr. Maia Watson/sammy

## 2023-10-25 NOTE — RESULT ENCOUNTER NOTE
Hello,    There was a polyp noted on the colonoscopy.  Given this it is usually advised to repeat colonoscopy in 5 years.  Depending on what the pathology shows this might change.  Still waiting for that report to return    Maia Watson MD

## 2023-12-11 ENCOUNTER — PATIENT OUTREACH (OUTPATIENT)
Dept: CARE COORDINATION | Facility: CLINIC | Age: 48
End: 2023-12-11
Payer: COMMERCIAL

## 2024-01-18 ENCOUNTER — ANCILLARY PROCEDURE (OUTPATIENT)
Dept: MAMMOGRAPHY | Facility: CLINIC | Age: 49
End: 2024-01-18
Attending: FAMILY MEDICINE
Payer: COMMERCIAL

## 2024-01-18 DIAGNOSIS — Z12.31 VISIT FOR SCREENING MAMMOGRAM: ICD-10-CM

## 2024-01-18 PROCEDURE — 77063 BREAST TOMOSYNTHESIS BI: CPT | Mod: GC | Performed by: STUDENT IN AN ORGANIZED HEALTH CARE EDUCATION/TRAINING PROGRAM

## 2024-01-18 PROCEDURE — 77067 SCR MAMMO BI INCL CAD: CPT | Mod: GC | Performed by: STUDENT IN AN ORGANIZED HEALTH CARE EDUCATION/TRAINING PROGRAM

## 2024-09-19 ENCOUNTER — OFFICE VISIT (OUTPATIENT)
Dept: FAMILY MEDICINE | Facility: CLINIC | Age: 49
End: 2024-09-19
Payer: COMMERCIAL

## 2024-09-19 VITALS
BODY MASS INDEX: 22.2 KG/M2 | SYSTOLIC BLOOD PRESSURE: 92 MMHG | DIASTOLIC BLOOD PRESSURE: 59 MMHG | OXYGEN SATURATION: 98 % | HEIGHT: 63 IN | HEART RATE: 67 BPM | WEIGHT: 125.3 LBS | TEMPERATURE: 97.4 F | RESPIRATION RATE: 16 BRPM

## 2024-09-19 DIAGNOSIS — Z12.31 ENCOUNTER FOR SCREENING MAMMOGRAM FOR BREAST CANCER: Primary | ICD-10-CM

## 2024-09-19 PROCEDURE — 99459 PELVIC EXAMINATION: CPT | Performed by: FAMILY MEDICINE

## 2024-09-19 PROCEDURE — 99396 PREV VISIT EST AGE 40-64: CPT | Performed by: FAMILY MEDICINE

## 2024-09-19 ASSESSMENT — PAIN SCALES - GENERAL: PAINLEVEL: NO PAIN (0)

## 2024-09-19 NOTE — PROGRESS NOTES
Preventive Care Visit  [facility]  Maia Watson MD, Family Medicine  Sep 19, 2024      Assessment & Plan     (Z12.31) Encounter for screening mammogram for breast cancer  (primary encounter diagnosis)  Comment: Patient will get COVID and flu vaccination from her office  Patient has no concerns for dyslipidemia or diabetes  Plan: MA Screen Bilateral w/[place]                    Counseling  Appropriate preventive services were addressed with this patient via screening, questionnaire, or discussion as appropriate for fall prevention, nutrition, physical activity, Tobacco-use cessation, social engagement, weight loss and cognition.  Checklist reviewing preventive services available has been given to the patient.  Reviewed patient's diet, addressing concerns and/or questions.       See Patient Instructions    Subjective   Cherrie is a 48 year old, presenting for the following:  Physical        9/19/2024     2:29 PM   Additional Questions   Roomed by [facility] Care Directive  Patient does not have a Health Care Directive or Living Will: Discussed advance care planning with patient; information given to patient to review.    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 Excellent   Feel stress (tense, anxious, or unable to sleep) Not at all            9/19/2024   Nutrition   Three or more servings of calcium each day? Yes   Diet: Gluten-free/reduced   How many servings of fruit and vegetables per day? 4 or more   How many  sweetened beverages each day? 0-1            9/19/2024   Exercise   Days per week of moderate/strenous exercise 6 days            9/19/2024   Social Factors   Frequency of gathering with friends or relatives Twice a week   Worry food won't last until get money to buy more No   Food not last or not have enough money for food? No   Do you have housing? (Housing is defined as stable permanent housing and does not include staying ouside in a car, in a tent, in an abandoned building, in an overnight shelter, or couch-surfing.) Yes   Are you worried about losing your housing? No   Lack of transportation? No   Unable to get utilities (heat,electricity)? No            9/19/2024   Dental   Dentist two times every year? Yes            9/19/2024   TB Screening   Were you born outside of the [place]? No            Today's PHQ-2 Score:       9/19/2024     2:06 PM   PHQ-2 ( 1999 Pf[employer])   Q1: Little interest or pleasure in doing things 0   Q2: Feeling down, depressed or hopeless 0   PHQ-2 Score 0   Q1: Little interest or pleasure in doing things Not at all   Q2: Feeling down, depressed or hopeless Not at all   PHQ-2 Score 0           9/19/2024   Substance Use   Alcohol more than 3/day or more than 7/wk No   Do you use any other substances recreationally? No        Social History     Tobacco Use    Smoking status: Never    Smokeless tobacco: Never   Substance Use Topics    Alcohol use: Yes     Alcohol/week: 0.8 standard drinks of alcohol     Comment: Rarely.  1 drink.    Drug use: No           1/18/2024   LAST FHS-7 RESULTS   1st degree relative breast or ovarian cancer Yes   Any relative bilateral breast cancer No   Any male have breast cancer No   Any ONE woman have BOTH breast AND ovarian cancer No   Any woman with breast cancer before 50yrs Yes   2 or more relatives with breast AND/OR ovarian cancer No   2 or more relatives with breast AND/OR bowel cancer No           Mammogram Screening - Mammogram every 1-2 years updated in  "Health Maintenance based on mutual decision making        9/19/2024   STI Screening   New sexual partner(s) since last STI/HIV test? No        History of abnormal Pap smear: No - age 30- 64 PAP with HPV every 5 years recommended        Latest Ref Rng & Units 5/13/2021     1:30 PM 5/13/2021     1:28 PM 5/2/2018     3:58 PM   PAP / HPV   PAP (Historical)   NIL     HPV 16 DNA NEG^Negative Negative   Negative    HPV 18 DNA NEG^Negative Negative   Negative    Other HR HPV NEG^Negative Negative   Negative      ASCVD Risk   The ASCVD Risk score (Delma DIAZ, et al., 2019) failed to calculate for the following reasons:    Cannot find a previous HDL lab    Cannot find a previous total cholesterol lab        9/19/2024   Contraception/Family Planning   Questions about contraception or family planning (!) YES as noted above           Reviewed and updated as needed this visit by Provider         Seb Crockett                  Review of Systems  Constitutional, HEENT, cardiovascular, pulmonary, gi and gu systems are negative, except as otherwise noted.     Objective    Exam  BP 92/59 (BP Location: Right arm, Patient Position: Sitting, Cuff Size: Adult Regular)   Pulse 67   Temp 97.4  F (36.3  C) (Skin)   Resp 16   Ht 1.607 m (5' 3.25\")   Wt 56.8 kg (125 lb 4.8 oz)   LMP 09/01/2024   SpO2 98%   Breastfeeding No   BMI 22.02 kg/m     Estimated body mass index is 22.02 kg/m  as calculated from the following:    Height as of this encounter: 1.607 m (5' 3.25\").    Weight as of this encounter: 56.8 kg (125 lb 4.8 oz).    Physical Exam  GENERAL: alert and no distress  EYES: Eyes grossly normal to inspection, PERRL and conjunctivae and sclerae normal  HENT: ear canals and TM's normal, nose and mouth without ulcers or lesions  NECK: no adenopathy, no asymmetry, masses, or scars  RESP: lungs clear to auscultation - no rales, rhonchi or wheezes  BREAST: normal without masses, tenderness or nipple discharge and no palpable axillary " masses or adenopathy  CV: regular rate and rhythm, normal S1 S2, no S3 or S4, no murmur, click or rub, no peripheral edema  ABDOMEN: soft, nontender, no hepatosplenomegaly, no masses and bowel sounds normal   (female) w/bimanual: normal female external genitalia, normal urethral meatus, normal vaginal mucosa, and normal cervix/adnexa/uterus without masses or discharge  IUD strings are seen at the cervical os 2 cm length  MS: no gross musculoskeletal defects noted, no edema  SKIN: no suspicious lesions or rashes  NEURO: Normal strength and tone, mentation intact and speech normal  PSYCH: mentation appears normal, affect normal/bright        Signed Electronically by: Maia Watson MD

## 2025-01-20 ENCOUNTER — ANCILLARY PROCEDURE (OUTPATIENT)
Dept: MAMMOGRAPHY | Facility: CLINIC | Age: 50
End: 2025-01-20
Attending: FAMILY MEDICINE
Payer: COMMERCIAL

## 2025-01-20 DIAGNOSIS — Z12.31 ENCOUNTER FOR SCREENING MAMMOGRAM FOR BREAST CANCER: ICD-10-CM

## 2025-01-20 PROCEDURE — 77067 SCR MAMMO BI INCL CAD: CPT | Performed by: RADIOLOGY

## 2025-01-20 PROCEDURE — 77063 BREAST TOMOSYNTHESIS BI: CPT | Performed by: RADIOLOGY
